# Patient Record
Sex: MALE | Race: WHITE | NOT HISPANIC OR LATINO | Employment: OTHER | URBAN - METROPOLITAN AREA
[De-identification: names, ages, dates, MRNs, and addresses within clinical notes are randomized per-mention and may not be internally consistent; named-entity substitution may affect disease eponyms.]

---

## 2018-06-19 ENCOUNTER — APPOINTMENT (EMERGENCY)
Dept: RADIOLOGY | Facility: HOSPITAL | Age: 53
End: 2018-06-19
Payer: COMMERCIAL

## 2018-06-19 ENCOUNTER — HOSPITAL ENCOUNTER (EMERGENCY)
Facility: HOSPITAL | Age: 53
Discharge: HOME | End: 2018-06-19
Attending: EMERGENCY MEDICINE
Payer: COMMERCIAL

## 2018-06-19 VITALS
SYSTOLIC BLOOD PRESSURE: 154 MMHG | RESPIRATION RATE: 18 BRPM | DIASTOLIC BLOOD PRESSURE: 76 MMHG | WEIGHT: 290 LBS | BODY MASS INDEX: 42.95 KG/M2 | OXYGEN SATURATION: 98 % | HEIGHT: 69 IN | HEART RATE: 110 BPM | TEMPERATURE: 97.6 F

## 2018-06-19 DIAGNOSIS — R46.89 ABNORMAL BEHAVIOR: Primary | ICD-10-CM

## 2018-06-19 LAB
ALBUMIN SERPL BCP-MCNC: 3.9 G/DL (ref 3.5–5)
ALP SERPL-CCNC: 63 U/L (ref 46–116)
ALT SERPL W P-5'-P-CCNC: 55 U/L (ref 12–78)
AMPHETAMINES SERPL QL SCN: NEGATIVE
ANION GAP SERPL CALCULATED.3IONS-SCNC: 12 MMOL/L (ref 4–13)
AST SERPL W P-5'-P-CCNC: 32 U/L (ref 5–45)
BACTERIA UR QL AUTO: ABNORMAL /HPF
BARBITURATES UR QL: NEGATIVE
BASOPHILS # BLD AUTO: 0.05 THOUSANDS/ΜL (ref 0–0.1)
BASOPHILS NFR BLD AUTO: 1 % (ref 0–1)
BENZODIAZ UR QL: NEGATIVE
BILIRUB SERPL-MCNC: 0.5 MG/DL (ref 0.2–1)
BILIRUB UR QL STRIP: NEGATIVE
BUN SERPL-MCNC: 16 MG/DL (ref 5–25)
CALCIUM SERPL-MCNC: 9.2 MG/DL (ref 8.3–10.1)
CAOX CRY URNS QL MICRO: ABNORMAL /HPF
CHLORIDE SERPL-SCNC: 101 MMOL/L (ref 100–108)
CLARITY UR: CLEAR
CO2 SERPL-SCNC: 24 MMOL/L (ref 21–32)
COCAINE UR QL: NEGATIVE
COLOR UR: ABNORMAL
CREAT SERPL-MCNC: 0.96 MG/DL (ref 0.6–1.3)
EOSINOPHIL # BLD AUTO: 0.12 THOUSAND/ΜL (ref 0–0.61)
EOSINOPHIL NFR BLD AUTO: 1 % (ref 0–6)
ERYTHROCYTE [DISTWIDTH] IN BLOOD BY AUTOMATED COUNT: 13.3 % (ref 11.6–15.1)
ETHANOL SERPL-MCNC: <3 MG/DL (ref 0–3)
GFR SERPL CREATININE-BSD FRML MDRD: 89 ML/MIN/1.73SQ M
GLUCOSE SERPL-MCNC: 197 MG/DL (ref 65–140)
GLUCOSE UR STRIP-MCNC: NEGATIVE MG/DL
HCT VFR BLD AUTO: 40.6 % (ref 36.5–49.3)
HGB BLD-MCNC: 13.2 G/DL (ref 12–17)
HGB UR QL STRIP.AUTO: NEGATIVE
HYALINE CASTS #/AREA URNS LPF: ABNORMAL /LPF
IMM GRANULOCYTES # BLD AUTO: 0.02 THOUSAND/UL (ref 0–0.2)
IMM GRANULOCYTES NFR BLD AUTO: 0 % (ref 0–2)
KETONES UR STRIP-MCNC: ABNORMAL MG/DL
LEUKOCYTE ESTERASE UR QL STRIP: NEGATIVE
LYMPHOCYTES # BLD AUTO: 2.24 THOUSANDS/ΜL (ref 0.6–4.47)
LYMPHOCYTES NFR BLD AUTO: 26 % (ref 14–44)
MCH RBC QN AUTO: 27.6 PG (ref 26.8–34.3)
MCHC RBC AUTO-ENTMCNC: 32.5 G/DL (ref 31.4–37.4)
MCV RBC AUTO: 85 FL (ref 82–98)
METHADONE UR QL: NEGATIVE
MONOCYTES # BLD AUTO: 0.73 THOUSAND/ΜL (ref 0.17–1.22)
MONOCYTES NFR BLD AUTO: 9 % (ref 4–12)
MUCOUS THREADS UR QL AUTO: ABNORMAL
NEUTROPHILS # BLD AUTO: 5.37 THOUSANDS/ΜL (ref 1.85–7.62)
NEUTS SEG NFR BLD AUTO: 63 % (ref 43–75)
NITRITE UR QL STRIP: NEGATIVE
NON-SQ EPI CELLS URNS QL MICRO: ABNORMAL /HPF
NRBC BLD AUTO-RTO: 0 /100 WBCS
OPIATES UR QL SCN: NEGATIVE
PCP UR QL: NEGATIVE
PH UR STRIP.AUTO: 5 [PH] (ref 5–9)
PLATELET # BLD AUTO: 224 THOUSANDS/UL (ref 149–390)
PMV BLD AUTO: 11.7 FL (ref 8.9–12.7)
POTASSIUM SERPL-SCNC: 3.5 MMOL/L (ref 3.5–5.3)
PROT SERPL-MCNC: 7.3 G/DL (ref 6.4–8.2)
PROT UR STRIP-MCNC: ABNORMAL MG/DL
RBC # BLD AUTO: 4.78 MILLION/UL (ref 3.88–5.62)
RBC #/AREA URNS AUTO: ABNORMAL /HPF
SODIUM SERPL-SCNC: 137 MMOL/L (ref 136–145)
SP GR UR STRIP.AUTO: >=1.03 (ref 1–1.03)
THC UR QL: POSITIVE
TROPONIN I SERPL-MCNC: <0.02 NG/ML
UROBILINOGEN UR QL STRIP.AUTO: 0.2 E.U./DL
WBC # BLD AUTO: 8.53 THOUSAND/UL (ref 4.31–10.16)
WBC #/AREA URNS AUTO: ABNORMAL /HPF

## 2018-06-19 PROCEDURE — 80053 COMPREHEN METABOLIC PANEL: CPT | Performed by: EMERGENCY MEDICINE

## 2018-06-19 PROCEDURE — 84484 ASSAY OF TROPONIN QUANT: CPT | Performed by: EMERGENCY MEDICINE

## 2018-06-19 PROCEDURE — 36415 COLL VENOUS BLD VENIPUNCTURE: CPT | Performed by: EMERGENCY MEDICINE

## 2018-06-19 PROCEDURE — 85025 COMPLETE CBC W/AUTO DIFF WBC: CPT | Performed by: EMERGENCY MEDICINE

## 2018-06-19 PROCEDURE — 80307 DRUG TEST PRSMV CHEM ANLYZR: CPT | Performed by: EMERGENCY MEDICINE

## 2018-06-19 PROCEDURE — 96372 THER/PROPH/DIAG INJ SC/IM: CPT

## 2018-06-19 PROCEDURE — 93005 ELECTROCARDIOGRAM TRACING: CPT

## 2018-06-19 PROCEDURE — 80320 DRUG SCREEN QUANTALCOHOLS: CPT | Performed by: EMERGENCY MEDICINE

## 2018-06-19 PROCEDURE — 81001 URINALYSIS AUTO W/SCOPE: CPT | Performed by: EMERGENCY MEDICINE

## 2018-06-19 PROCEDURE — G0480 DRUG TEST DEF 1-7 CLASSES: HCPCS | Performed by: EMERGENCY MEDICINE

## 2018-06-19 PROCEDURE — 71045 X-RAY EXAM CHEST 1 VIEW: CPT

## 2018-06-19 PROCEDURE — 99285 EMERGENCY DEPT VISIT HI MDM: CPT

## 2018-06-19 RX ORDER — LORAZEPAM 2 MG/ML
INJECTION INTRAMUSCULAR
Status: DISCONTINUED
Start: 2018-06-19 | End: 2018-06-19

## 2018-06-19 RX ORDER — LORAZEPAM 2 MG/ML
2 INJECTION INTRAMUSCULAR ONCE
Status: COMPLETED | OUTPATIENT
Start: 2018-06-19 | End: 2018-06-19

## 2018-06-19 RX ORDER — OLANZAPINE 10 MG/1
INJECTION, POWDER, LYOPHILIZED, FOR SOLUTION INTRAMUSCULAR
Status: COMPLETED
Start: 2018-06-19 | End: 2018-06-19

## 2018-06-19 RX ORDER — OLANZAPINE 10 MG/1
10 INJECTION, POWDER, LYOPHILIZED, FOR SOLUTION INTRAMUSCULAR ONCE
Status: COMPLETED | OUTPATIENT
Start: 2018-06-19 | End: 2018-06-19

## 2018-06-19 RX ADMIN — Medication 1.2 ML: at 06:29

## 2018-06-19 RX ADMIN — LORAZEPAM 2 MG: 2 INJECTION INTRAMUSCULAR at 06:29

## 2018-06-19 RX ADMIN — WATER 1.2 ML: 1 INJECTION INTRAMUSCULAR; INTRAVENOUS; SUBCUTANEOUS at 06:29

## 2018-06-19 RX ADMIN — LORAZEPAM 2 MG: 2 INJECTION INTRAMUSCULAR; INTRAVENOUS at 06:29

## 2018-06-19 RX ADMIN — OLANZAPINE 10 MG: 10 INJECTION, POWDER, FOR SOLUTION INTRAMUSCULAR at 06:29

## 2018-06-19 RX ADMIN — OLANZAPINE 10 MG: 10 INJECTION, POWDER, LYOPHILIZED, FOR SOLUTION INTRAMUSCULAR at 06:29

## 2018-06-19 NOTE — ED NOTES
Patient was woken up to have vital signs  Patient was calm and cooperative  Respirations easy and unlabored  Patient was tearful, expressing guilt for behaviors from the morning  Patient states that last night he was in a situation where the person that he and his fiance were visiting became angry  He states that when something like that happens it is "monkey see, monkey do" and that he gets angry and upset as well  Patient was reassured of safety and that as long as he continues to be cooperative he won't have to be medicated or restrained again  Constant visual observation remains       Gabriele Medina RN  06/19/18 5041

## 2018-06-19 NOTE — ED NOTES
Xiomara arranged for patient and fiance at the approval of the supervisor  Xiomara voucher provided  Patient aware of pending discharge  Agreeable       Kurt Davis RN  06/19/18 0280

## 2018-06-19 NOTE — ED PROVIDER NOTES
History  Chief Complaint   Patient presents with    Psychiatric Evaluation     pt brought in by squad with female friend he states is his fiance of 20 years  First states they were in a park, then states they were looking for apartments, states his car ran out of gas  States he is afraid of his brother, thinks his brother is trying to hurt him     38-year-old white male with mild MR reports leaving his brother's home because he felt there was unusual activity going on at the house  Patient reportedly left with his girlfriend and their car broke down en route to [place]  Patient and his girlfriend were walking in the park when they called 911 for assistance  Patient's story constantly changing, very poor historian, very paranoid  And agitated  Patient pacing around the room  Patient able to be redirected however clearly agitated  Patient denies alcohol use but states he did take 3 OxyContin for his chronic pain  Patient denies any complaints and states he does not want to be here  No suicidal ideation no homicidal ideation  Patient reports feeling uneasy        History provided by:  Patient, EMS personnel and medical records  History limited by: Patient with mild MR  Psychiatric Evaluation   Presenting symptoms: aggressive behavior, agitation, bizarre behavior and paranoid behavior    Presenting symptoms: no suicidal thoughts, no suicidal threats and no suicide attempt    Degree of incapacity (severity): Moderate  Onset quality:  Unable to specify  Timing:  Unable to specify  Progression:  Unable to specify  Context: noncompliance    Context: not alcohol use    Treatment compliance:  Some of the time  Relieved by:  None tried  Worsened by:  Lack of sleep  Ineffective treatments:  None tried  Associated symptoms: anxiety        None       History reviewed  No pertinent past medical history  Past Surgical History:   Procedure Laterality Date    CHOLECYSTECTOMY         History reviewed   No pertinent family history  I have reviewed and agree with the history as documented  Social History   Substance Use Topics    Smoking status: Former Smoker    Smokeless tobacco: Not on file    Alcohol use No        Review of Systems   Constitutional: Negative for chills and fever  HENT: Negative  Respiratory: Negative  Cardiovascular: Negative  Gastrointestinal: Negative  Genitourinary: Negative  Musculoskeletal: Negative  Skin: Negative  Neurological: Negative  Hematological: Negative  Psychiatric/Behavioral: Positive for agitation and paranoia  Negative for suicidal ideas  The patient is nervous/anxious  All other systems reviewed and are negative  Physical Exam  Physical Exam   Constitutional: He appears well-developed and well-nourished  HENT:   Head: Normocephalic and atraumatic  Right Ear: External ear normal    Left Ear: External ear normal    Nose: Nose normal    Mouth/Throat: Oropharynx is clear and moist    Eyes: Conjunctivae and EOM are normal    Neck: Normal range of motion  Neck supple  Cardiovascular: Regular rhythm, S1 normal, S2 normal, normal heart sounds, intact distal pulses and normal pulses  Tachycardia present  Pulmonary/Chest: Breath sounds normal  No respiratory distress  Abdominal: Soft  Normal appearance and bowel sounds are normal        Musculoskeletal:   PVD changes bilateral lower extremities   Neurological: He is alert  Skin: Skin is warm and dry  Capillary refill takes less than 2 seconds  Nursing note and vitals reviewed        Vital Signs  ED Triage Vitals   Temperature Pulse Respirations Blood Pressure SpO2   06/19/18 0447 06/19/18 0446 06/19/18 0446 06/19/18 0446 06/19/18 0446   97 8 °F (36 6 °C) (!) 112 20 (!) 175/89 99 %      Temp Source Heart Rate Source Patient Position - Orthostatic VS BP Location FiO2 (%)   06/19/18 0447 06/19/18 0446 06/19/18 0446 06/19/18 0446 --   Tympanic Monitor Lying Right arm       Pain Score 06/19/18 0446       No Pain           Vitals:    06/19/18 0446   BP: (!) 175/89   Pulse: (!) 112   Patient Position - Orthostatic VS: Lying       Visual Acuity      ED Medications  Medications   LORazepam (ATIVAN) 2 mg/mL injection 2 mg (2 mg Intramuscular Given 6/19/18 0629)   OLANZapine (ZyPREXA) IM injection 10 mg (10 mg Intramuscular Given 6/19/18 0629)   sterile water injection 10 mL (1 2 mL Injection Given 6/19/18 0629)       Diagnostic Studies  Results Reviewed     Procedure Component Value Units Date/Time    Troponin I [[id number]]     Lab Status:  No result Specimen:  Blood     Ethanol [[id number]]  (Normal) Collected:  06/19/18 0526    Lab Status:  Final result Specimen:  Blood from Arm, Left Updated:  06/19/18 0634     Ethanol Lvl <3 mg/dL     Comprehensive metabolic panel [[id number]]  (Abnormal) Collected:  06/19/18 0526    Lab Status:  Final result Specimen:  Blood from Arm, Left Updated:  06/19/18 6779     Sodium 137 mmol/L      Potassium 3 5 mmol/L      Chloride 101 mmol/L      CO2 24 mmol/L      Anion Gap 12 mmol/L      BUN 16 mg/dL      Creatinine 0 96 mg/dL      Glucose 197 (H) mg/dL      Calcium 9 2 mg/dL      AST 32 U/L      ALT 55 U/L      Alkaline Phosphatase 63 U/L      Total Protein 7 3 g/dL      Albumin 3 9 g/dL      Total Bilirubin 0 50 mg/dL      eGFR 89 ml/min/1 73sq m     Narrative:         National Kidney Disease Education Program recommendations are as follows:  GFR calculation is accurate only with a steady state creatinine  Chronic Kidney disease less than 60 ml/min/1 73 sq  meters  Kidney failure less than 15 ml/min/1 73 sq  meters      Urine Microscopic [[id number]]  (Abnormal) Collected:  06/19/18 0526    Lab Status:  Final result Specimen:  Urine from Urine, Clean Catch Updated:  06/19/18 0549     RBC, UA None Seen /hpf      WBC, UA 2-4 (A) /hpf      Epithelial Cells Innumerable (A) /hpf      Bacteria, UA Moderate (A) /hpf      Hyaline Casts, UA 0-1 (A) /lpf      Ca Oxalate Ellen, UA Occasional (A) /hpf      MUCOUS THREADS Moderate (A)    CBC and differential [[id number]] Collected:  06/19/18 0526    Lab Status:  Final result Specimen:  Blood from Arm, Left Updated:  06/19/18 0542     WBC 8 53 Thousand/uL      RBC 4 78 Million/uL      Hemoglobin 13 2 g/dL      Hematocrit 40 6 %      MCV 85 fL      MCH 27 6 pg      MCHC 32 5 g/dL      RDW 13 3 %      MPV 11 7 fL      Platelets 895 Thousands/uL      nRBC 0 /100 WBCs      Neutrophils Relative 63 %      Immat GRANS % 0 %      Lymphocytes Relative 26 %      Monocytes Relative 9 %      Eosinophils Relative 1 %      Basophils Relative 1 %      Neutrophils Absolute 5 37 Thousands/µL      Immature Grans Absolute 0 02 Thousand/uL      Lymphocytes Absolute 2 24 Thousands/µL      Monocytes Absolute 0 73 Thousand/µL      Eosinophils Absolute 0 12 Thousand/µL      Basophils Absolute 0 05 Thousands/µL     UA w Reflex to Microscopic w Reflex to Culture [[id number]]  (Abnormal) Collected:  06/19/18 0526    Lab Status:  Final result Specimen:  Urine from Urine, Clean Catch Updated:  06/19/18 0540     Color, UA Nina     Clarity, UA Clear     Specific Gravity, UA >=1 030     pH, UA 5 0     Leukocytes, UA Negative     Nitrite, UA Negative     Protein, UA Trace (A) mg/dl      Glucose, UA Negative mg/dl      Ketones, UA Trace (A) mg/dl      Urobilinogen, UA 0 2 E U /dl      Bilirubin, UA Negative     Blood, UA Negative    Rapid drug screen, urine [[id number]] Collected:  06/19/18 0526    Lab Status:   In process Specimen:  Urine from Urine, Catheter Updated:  06/19/18 0536                 XR chest 1 view portable    (Results Pending)              Procedures  ECG 12 Lead Documentation  Date/Time: 6/19/2018 6:57 AM  Performed by: Samina Cope  Authorized by: Samina Cope     Indications / Diagnosis:  AMS  ECG reviewed by me, the ED Provider: yes    Patient location:  ED  Previous ECG:     Comparison to cardiac monitor: Yes ()    Rate:     ECG rate:  129    ECG rate assessment: tachycardic    Rhythm:     Rhythm: sinus tachycardia    Ectopy:     Ectopy: none    QRS:     QRS axis:  Normal    QRS intervals:  Normal  Conduction:     Conduction: normal    ST segments:     ST segments:  Normal  T waves:     T waves: normal             Phone Contacts  ED Phone Contact    ED Course                               MDM  Number of Diagnoses or Management Options     Amount and/or Complexity of Data Reviewed  Clinical lab tests: ordered and reviewed  Tests in the radiology section of CPT®: ordered and reviewed  Tests in the medicine section of CPT®: ordered and reviewed  Decide to obtain previous medical records or to obtain history from someone other than the patient: yes  Independent visualization of images, tracings, or specimens: yes      CritCare Time    Disposition  Final diagnoses:   None     ED Disposition     None      Follow-up Information    None         Patient's Medications    No medications on file     No discharge procedures on file      ED Provider  Electronically Signed by           Rianna Faulkner MD  06/19/18 7198

## 2018-06-19 NOTE — ED NOTES
Pt states he has been to [facility] in the past   Faxed a signed release of records to [facility], [address] [place] [address]  06/19/18 1962

## 2018-06-19 NOTE — ED NOTES
Patient is awake and alert  Patient is oriented to person, place, time, and situation  Patient is pleasant and remorseful over behavior from the morning  Patient was thankful for the lunch tray and states "this was the best food I've had in awhile"  Will continue to monitor       Johnny Unger RN  06/19/18 9461

## 2018-06-19 NOTE — DISCHARGE INSTRUCTIONS
Please take a list of all of your medications and discharge paperwork with you to all of your follow-up medical visits  Please take all of your medications as directed  Please call your family doctor or return to the ER if you have increased shortness of breath, chest pain, fevers, chills, nausea, vomiting, diarrhea, or any other worsening symptoms

## 2018-06-19 NOTE — ED NOTES
Patient becoming increasingly agitated unable to verbally get him to comply  Patient trying to exit his room, security called and patient became increasingly more agitated attempted to elope  Patient became increasingly confrontational, attempted to enter another patient's room and had to be physically restrained and returned to his room  Police called for assistance, the patient become increasingly more agitated and combative  Patient poses huge safety risk to himself and others       Higinio Ventura MD  06/19/18 7556

## 2018-06-19 NOTE — ED NOTES
Lunch tray delivered to pt   Pt becomes tearful, stating "You people really are just trying to help me, i'm so sorry"     Carter Pace  06/19/18 3934

## 2018-06-19 NOTE — ED NOTES
Patient is sleeping with breakfast tray on chest  Respirations easy and unlabored  Responsive to verbal stimuli  Occasionally repositions self  One siderail remains up for safety  Continual observation remains       Rafael Perez RN  06/19/18 2177

## 2018-06-19 NOTE — ED NOTES
Patient sitting up eating lunch  Constant visual observation remains       Sherren Guadalajara, RN  06/19/18 6399

## 2018-06-19 NOTE — ED NOTES
Patient appears to be sleeping, laying on right side  Respirations easy and unlabored  Responsive to verbal stimuli and falls back asleep  Continual observation remains in place       Rafael Perez RN  06/19/18 6115

## 2018-06-19 NOTE — ED NOTES
Right arm released from restraint  Patient remains restless  States "I am trying to roll on my side"  Explained to patient that provided his behavior stays calm and cooperative, the rest of the restraints will be taken off shortly       Iris Addison RN  06/19/18

## 2018-06-19 NOTE — ED NOTES
Pt needs a lot of redirection  He is able to state where he is and the year but is unable to state the month  He needs continual reminding to stay in bed    When he gets out of bed and takes the covers off, he states that he is cold and needs to be reminded to lay in bed and use the blankets       Alona Obrien RN  06/19/18 1931

## 2018-06-19 NOTE — ED NOTES
Warm blankets and ice water given to patient  Patient was polite and thankful       Kwasi Subramanian, RN  06/19/18 7547

## 2018-06-19 NOTE — ED NOTES
Discharge instructions reviewed with patient  Patient states understanding  Patient is tearful on discharge, thankful for care  Discharged to home via taxi       Raisa Yip RN  06/19/18 5466

## 2018-06-19 NOTE — PROGRESS NOTES
6/19/18 @ 1000:  PES attempted to meet with patient, but he was sleeping soundly, therefore, PES will try again later  [place], [place]  1500: After PES met with patient, it was determined that this was a social issue, and not psychiatric  PES has been assisting patient's fiance (who is also a patient) to get a ride back home to [place], [place]  Patient said his engine "blew up "  PES has been working with patient's fiance to secure a ride, including contacting [place] Medicaid, but was unsuccessful  PES consulted with ED charge RN, who discussed with RN supervisor, and it was determined that hospital will pay for a taxi ride home  PES talked to Lisa Chester RN casemanager, who also felt that a taxi voucher was the least expensive solution  Charge RN is waiting for voucher, then TERRIE silvestre will be contacted  [address], [address]: Charge RN presented voucher to [facility], who called TERRIE silvestre;  @ [phone]    [address], [place]

## 2018-06-19 NOTE — ED CARE HANDOFF
Emergency Department Sign Out Note        Sign out and transfer of care from previous provider  See Separate Emergency Department note  The patient, Carlotta Sauer, was evaluated by the previous provider for behavioral disturbance  Workup Completed:  Medical clearance workup    ED Course / Workup Pending (followup):                               Procedures  MDM  Number of Diagnoses or Management Options  Abnormal behavior: new and requires workup     Amount and/or Complexity of Data Reviewed  Clinical lab tests: reviewed  Tests in the radiology section of CPT®: reviewed    Risk of Complications, Morbidity, and/or Mortality  General comments: Patient was medically cleared and evaluated by our crisis worker  At this time patient appears to be more lucid and very cooperative with staff  Patient denies any suicidal or homicidal ideation  At this point patient is not at any danger of harming himself or others  Patient is from [place]  Case was discussed with care management to arrange for cab voucher to discharge patient back to his hometown where he can follow up with his family doctor  Close return instructions given to return to the ER for any worsening symptoms  Patient agrees with discharge plan  Patient well appearing at time of discharge  Patient Progress  Patient progress: improved    CritCare Time      Disposition  Final diagnoses:   Abnormal behavior     Time reflects when diagnosis was documented in both MDM as applicable and the Disposition within this note     Time User Action Codes Description Comment    6/19/2018  3:15 PM Lenka Hess Add [R49 [id number]] Abnormal behavior       ED Disposition     ED Disposition Condition Comment    Discharge  Carlotta Sauer discharge to home/self care      Condition at discharge: Good        Follow-up Information     Follow up With Specialties Details Why Contact Kiko Woodard MD  In 2 days  [address] [facility] [address]  [phone]          Patient's Medications    No medications on file     No discharge procedures on file         ED Provider  Electronically Signed by     Gonzalo Moseley DO  06/19/18 1282

## 2018-06-19 NOTE — ED NOTES
Patient remains calm and cooperative  Patient states understanding when reminded to remain calm and cooperative  Restraints removed, breakfast tray provided       Doc Gatica RN  06/19/18 2862

## 2018-06-19 NOTE — ED NOTES
Pt became extremely agitated and out of control  Physically and verbally aggressive with staff  Attempting to enter other patient's room  Security present  Police called         Ephraim Dinh RN  06/19/18 7763

## 2018-06-19 NOTE — RESTRAINT FACE TO FACE
Restraint Face to Face   Olimpia Rodriguez 52 y o  male MRN: [id number]  Unit/Bed#: ED 07 Encounter: [id number]      Physical Evaluation patient has no obvious acute medical or physical problems    Purpose for Restraints/ Seclusion High risk for harm to others and himself  Patient's reaction to the intervention agitated and combative  Patient's medical condition stable  Patient's Behavioral condition agitated and aggressive  Restraints to be Continued

## 2018-06-21 LAB
ATRIAL RATE: 129 BPM
P AXIS: 43 DEGREES
PR INTERVAL: 132 MS
QRS AXIS: 53 DEGREES
QRSD INTERVAL: 84 MS
QT INTERVAL: 318 MS
QTC INTERVAL: 465 MS
T WAVE AXIS: 31 DEGREES
VENTRICULAR RATE: 129 BPM

## 2018-06-21 PROCEDURE — 93010 ELECTROCARDIOGRAM REPORT: CPT | Performed by: INTERNAL MEDICINE

## 2018-11-14 ENCOUNTER — HOSPITAL ENCOUNTER (EMERGENCY)
Facility: HOSPITAL | Age: 53
Discharge: HOME | End: 2018-11-15
Attending: EMERGENCY MEDICINE | Admitting: EMERGENCY MEDICINE
Payer: COMMERCIAL

## 2018-11-14 DIAGNOSIS — M79.89 LEG SWELLING: Primary | ICD-10-CM

## 2018-11-14 DIAGNOSIS — Z59.00 HOMELESSNESS: ICD-10-CM

## 2018-11-14 PROCEDURE — 99284 EMERGENCY DEPT VISIT MOD MDM: CPT

## 2018-11-14 SDOH — ECONOMIC STABILITY - HOUSING INSECURITY: HOMELESSNESS UNSPECIFIED: Z59.00

## 2018-11-15 ENCOUNTER — APPOINTMENT (EMERGENCY)
Dept: RADIOLOGY | Facility: HOSPITAL | Age: 53
End: 2018-11-15
Payer: COMMERCIAL

## 2018-11-15 VITALS
SYSTOLIC BLOOD PRESSURE: 134 MMHG | OXYGEN SATURATION: 96 % | RESPIRATION RATE: 20 BRPM | HEART RATE: 98 BPM | DIASTOLIC BLOOD PRESSURE: 63 MMHG | TEMPERATURE: 98.1 F | WEIGHT: 283.51 LBS

## 2018-11-15 LAB
ALBUMIN SERPL BCP-MCNC: 3.7 G/DL (ref 3.5–5)
ALP SERPL-CCNC: 72 U/L (ref 46–116)
ALT SERPL W P-5'-P-CCNC: 48 U/L (ref 12–78)
AMPHETAMINES SERPL QL SCN: NEGATIVE
ANION GAP SERPL CALCULATED.3IONS-SCNC: 8 MMOL/L (ref 4–13)
AST SERPL W P-5'-P-CCNC: 19 U/L (ref 5–45)
BARBITURATES UR QL: NEGATIVE
BASOPHILS # BLD AUTO: 0.04 THOUSANDS/ΜL (ref 0–0.1)
BASOPHILS NFR BLD AUTO: 1 % (ref 0–1)
BENZODIAZ UR QL: NEGATIVE
BILIRUB SERPL-MCNC: 0.8 MG/DL (ref 0.2–1)
BILIRUB UR QL STRIP: NEGATIVE
BUN SERPL-MCNC: 9 MG/DL (ref 5–25)
CALCIUM SERPL-MCNC: 8.9 MG/DL (ref 8.3–10.1)
CHLORIDE SERPL-SCNC: 101 MMOL/L (ref 100–108)
CLARITY UR: CLEAR
CO2 SERPL-SCNC: 28 MMOL/L (ref 21–32)
COCAINE UR QL: NEGATIVE
COLOR UR: NORMAL
CREAT SERPL-MCNC: 0.78 MG/DL (ref 0.6–1.3)
EOSINOPHIL # BLD AUTO: 0.16 THOUSAND/ΜL (ref 0–0.61)
EOSINOPHIL NFR BLD AUTO: 2 % (ref 0–6)
ERYTHROCYTE [DISTWIDTH] IN BLOOD BY AUTOMATED COUNT: 14.1 % (ref 11.6–15.1)
ETHANOL SERPL-MCNC: <3 MG/DL (ref 0–3)
GFR SERPL CREATININE-BSD FRML MDRD: 102 ML/MIN/1.73SQ M
GLUCOSE SERPL-MCNC: 145 MG/DL (ref 65–140)
GLUCOSE UR STRIP-MCNC: NEGATIVE MG/DL
HCT VFR BLD AUTO: 41.3 % (ref 36.5–49.3)
HGB BLD-MCNC: 13.1 G/DL (ref 12–17)
HGB UR QL STRIP.AUTO: NEGATIVE
IMM GRANULOCYTES # BLD AUTO: 0.01 THOUSAND/UL (ref 0–0.2)
IMM GRANULOCYTES NFR BLD AUTO: 0 % (ref 0–2)
KETONES UR STRIP-MCNC: NEGATIVE MG/DL
LEUKOCYTE ESTERASE UR QL STRIP: NEGATIVE
LYMPHOCYTES # BLD AUTO: 2.27 THOUSANDS/ΜL (ref 0.6–4.47)
LYMPHOCYTES NFR BLD AUTO: 31 % (ref 14–44)
MCH RBC QN AUTO: 27.6 PG (ref 26.8–34.3)
MCHC RBC AUTO-ENTMCNC: 31.7 G/DL (ref 31.4–37.4)
MCV RBC AUTO: 87 FL (ref 82–98)
METHADONE UR QL: NEGATIVE
MONOCYTES # BLD AUTO: 0.76 THOUSAND/ΜL (ref 0.17–1.22)
MONOCYTES NFR BLD AUTO: 10 % (ref 4–12)
NEUTROPHILS # BLD AUTO: 4.12 THOUSANDS/ΜL (ref 1.85–7.62)
NEUTS SEG NFR BLD AUTO: 56 % (ref 43–75)
NITRITE UR QL STRIP: NEGATIVE
NRBC BLD AUTO-RTO: 0 /100 WBCS
NT-PROBNP SERPL-MCNC: 13 PG/ML
OPIATES UR QL SCN: NEGATIVE
PCP UR QL: NEGATIVE
PH UR STRIP.AUTO: 6 [PH] (ref 5–9)
PLATELET # BLD AUTO: 202 THOUSANDS/UL (ref 149–390)
PMV BLD AUTO: 11.6 FL (ref 8.9–12.7)
POTASSIUM SERPL-SCNC: 3.6 MMOL/L (ref 3.5–5.3)
PROT SERPL-MCNC: 7.2 G/DL (ref 6.4–8.2)
PROT UR STRIP-MCNC: NEGATIVE MG/DL
RBC # BLD AUTO: 4.75 MILLION/UL (ref 3.88–5.62)
SODIUM SERPL-SCNC: 137 MMOL/L (ref 136–145)
SP GR UR STRIP.AUTO: >=1.03 (ref 1–1.03)
THC UR QL: NEGATIVE
TROPONIN I SERPL-MCNC: <0.02 NG/ML
UROBILINOGEN UR QL STRIP.AUTO: 0.2 E.U./DL
WBC # BLD AUTO: 7.36 THOUSAND/UL (ref 4.31–10.16)

## 2018-11-15 PROCEDURE — 85025 COMPLETE CBC W/AUTO DIFF WBC: CPT | Performed by: EMERGENCY MEDICINE

## 2018-11-15 PROCEDURE — 84484 ASSAY OF TROPONIN QUANT: CPT | Performed by: EMERGENCY MEDICINE

## 2018-11-15 PROCEDURE — 36415 COLL VENOUS BLD VENIPUNCTURE: CPT | Performed by: EMERGENCY MEDICINE

## 2018-11-15 PROCEDURE — 80320 DRUG SCREEN QUANTALCOHOLS: CPT | Performed by: EMERGENCY MEDICINE

## 2018-11-15 PROCEDURE — 81003 URINALYSIS AUTO W/O SCOPE: CPT | Performed by: EMERGENCY MEDICINE

## 2018-11-15 PROCEDURE — 80053 COMPREHEN METABOLIC PANEL: CPT | Performed by: EMERGENCY MEDICINE

## 2018-11-15 PROCEDURE — 80307 DRUG TEST PRSMV CHEM ANLYZR: CPT | Performed by: EMERGENCY MEDICINE

## 2018-11-15 PROCEDURE — 71045 X-RAY EXAM CHEST 1 VIEW: CPT

## 2018-11-15 PROCEDURE — 93005 ELECTROCARDIOGRAM TRACING: CPT

## 2018-11-15 PROCEDURE — 83880 ASSAY OF NATRIURETIC PEPTIDE: CPT | Performed by: EMERGENCY MEDICINE

## 2018-11-15 NOTE — ED NOTES
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

## 2018-11-15 NOTE — ED PROVIDER NOTES
History  Chief Complaint   Patient presents with    Leg Swelling     ( walked from [place] to [facility], then to [facility] about a week ago  since then legs have been swollen)     52 yowm c/o bilateral leg swelling worsening over the last few days after walking from [facility] to [place]  Took him about 4 days to do it  Pt  Is homeless for the last 2 weeks  Was at shelter in [facility] but had to leave  Went to friend's house in [place] and has been there the last 3 days but friend said he had to leave  No leg pain  No chest pain or sob  No fever  No vomiting   + slight diarrhea but thinks it is because he has not been eating well  H/o type 2 DM but is not on any meds  Says he sees a PMD in [address] [place] [address] and was last there a few months ago  History provided by:  Patient   used: No        None       History reviewed  No pertinent past medical history  History reviewed  No pertinent surgical history  History reviewed  No pertinent family history  I have reviewed and agree with the history as documented  Social History   Substance Use Topics    Smoking status: Former Smoker    Smokeless tobacco: Never Used    Alcohol use No        Review of Systems   Constitutional: Negative  Negative for chills and fever  HENT: Negative  Negative for congestion and sore throat  Eyes: Negative  Respiratory: Negative  Negative for cough and shortness of breath  Cardiovascular: Positive for leg swelling  Negative for chest pain  Gastrointestinal: Negative  Negative for abdominal pain, diarrhea, nausea and vomiting  Genitourinary: Negative  Negative for dysuria, flank pain and hematuria  Musculoskeletal: Negative  Negative for back pain and myalgias  Skin: Negative  Negative for rash and wound  Neurological: Negative  Negative for dizziness and headaches  Psychiatric/Behavioral: Negative  Negative for confusion and hallucinations   The patient is not nervous/anxious  All other systems reviewed and are negative  Physical Exam  Physical Exam   Constitutional: He is oriented to person, place, and time  He appears well-developed and well-nourished  No distress  Clothes dirty and socks smell bad   HENT:   Head: Normocephalic and atraumatic  Eyes: Pupils are equal, round, and reactive to light  Conjunctivae and EOM are normal  No scleral icterus  Neck: Normal range of motion  Neck supple  Cardiovascular: Normal rate, regular rhythm and normal heart sounds  No murmur heard  Pulmonary/Chest: Effort normal and breath sounds normal  No respiratory distress  Abdominal: Soft  Bowel sounds are normal  He exhibits no distension  There is no tenderness  Musculoskeletal: Normal range of motion  He exhibits edema  He exhibits no tenderness or deformity  + chronic stasis changes lower ext, no pitting edema, DP palp  Not hot to touch, no cellulitis   Neurological: He is alert and oriented to person, place, and time  No cranial nerve deficit  He exhibits normal muscle tone  Skin: Skin is warm and dry  Capillary refill takes less than 2 seconds  No rash noted  He is not diaphoretic  No erythema  No pallor  Psychiatric: He has a normal mood and affect  His behavior is normal    Nursing note and vitals reviewed        Vital Signs  ED Triage Vitals   Temperature Pulse Respirations Blood Pressure SpO2   11/14/18 2342 11/14/18 2341 11/14/18 2341 11/14/18 2342 11/14/18 2341   98 1 °F (36 7 °C) 95 18 138/63 98 %      Temp Source Heart Rate Source Patient Position - Orthostatic VS BP Location FiO2 (%)   11/14/18 2342 11/14/18 2341 11/14/18 2341 11/14/18 2341 --   Oral Monitor Sitting Right arm       Pain Score       11/14/18 2341       3           Vitals:    11/14/18 2341 11/14/18 2342 11/15/18 0050 11/15/18 0818   BP:  138/63 135/76 134/63   Pulse: 95  88 98   Patient Position - Orthostatic VS: Sitting Sitting Sitting Sitting       Visual Acuity      ED Medications  Medications - No data to display    Diagnostic Studies  Results Reviewed     Procedure Component Value Units Date/Time    Rapid drug screen, urine [[id number]]  (Normal) Collected:  11/15/18 0509    Lab Status:  Final result Specimen:  Urine from Urine, Clean Catch Updated:  11/15/18 0533     Amph/Meth UR Negative     Barbiturate Ur Negative     Benzodiazepine Urine Negative     Cocaine Urine Negative     Methadone Urine Negative     Opiate Urine Negative     PCP Ur Negative     THC Urine Negative    Narrative:         FOR MEDICAL PURPOSES ONLY  IF CONFIRMATION NEEDED PLEASE CONTACT THE LAB WITHIN 5 DAYS      Drug Screen Cutoff Levels:  AMPHETAMINE/METHAMPHETAMINES  1000 ng/mL  BARBITURATES     200 ng/mL  BENZODIAZEPINES     200 ng/mL  COCAINE      300 ng/mL  METHADONE      300 ng/mL  OPIATES      300 ng/mL  PHENCYCLIDINE     25 ng/mL  THC       50 ng/mL    UA w Reflex to Microscopic [[id number]] Collected:  11/15/18 0509    Lab Status:  Final result Specimen:  Urine from Urine, Clean Catch Updated:  11/15/18 0516     Color, UA Nina     Clarity, UA Clear     Specific Gravity, UA >=1 030     pH, UA 6 0     Leukocytes, UA Negative     Nitrite, UA Negative     Protein, UA Negative mg/dl      Glucose, UA Negative mg/dl      Ketones, UA Negative mg/dl      Urobilinogen, UA 0 2 E U /dl      Bilirubin, UA Negative     Blood, UA Negative    Ethanol [[id number]]  (Normal) Collected:  11/15/18 0003    Lab Status:  Final result Specimen:  Blood from Arm, Left Updated:  11/15/18 0046     Ethanol Lvl <3 mg/dL     B-type natriuretic peptide [[id number]]  (Normal) Collected:  11/15/18 0003    Lab Status:  Final result Specimen:  Blood from Arm, Left Updated:  11/15/18 0033     NT-proBNP 13 pg/mL     Troponin I [[id number]]  (Normal) Collected:  11/15/18 0003    Lab Status:  Final result Specimen:  Blood from Arm, Left Updated:  11/15/18 0031     Troponin I <0 02 ng/mL     Comprehensive metabolic panel [[id number]] (Abnormal) Collected:  11/15/18 0003    Lab Status:  Final result Specimen:  Blood from Arm, Left Updated:  11/15/18 0026     Sodium 137 mmol/L      Potassium 3 6 mmol/L      Chloride 101 mmol/L      CO2 28 mmol/L      ANION GAP 8 mmol/L      BUN 9 mg/dL      Creatinine 0 78 mg/dL      Glucose 145 (H) mg/dL      Calcium 8 9 mg/dL      AST 19 U/L      ALT 48 U/L      Alkaline Phosphatase 72 U/L      Total Protein 7 2 g/dL      Albumin 3 7 g/dL      Total Bilirubin 0 80 mg/dL      eGFR 102 ml/min/1 73sq m     Narrative:         National Kidney Disease Education Program recommendations are as follows:  GFR calculation is accurate only with a steady state creatinine  Chronic Kidney disease less than 60 ml/min/1 73 sq  meters  Kidney failure less than 15 ml/min/1 73 sq  meters  CBC and differential [[id number]] Collected:  11/15/18 0003    Lab Status:  Final result Specimen:  Blood from Arm, Left Updated:  11/15/18 0008     WBC 7 36 Thousand/uL      RBC 4 75 Million/uL      Hemoglobin 13 1 g/dL      Hematocrit 41 3 %      MCV 87 fL      MCH 27 6 pg      MCHC 31 7 g/dL      RDW 14 1 %      MPV 11 6 fL      Platelets 996 Thousands/uL      nRBC 0 /100 WBCs      Neutrophils Relative 56 %      Immat GRANS % 0 %      Lymphocytes Relative 31 %      Monocytes Relative 10 %      Eosinophils Relative 2 %      Basophils Relative 1 %      Neutrophils Absolute 4 12 Thousands/µL      Immature Grans Absolute 0 01 Thousand/uL      Lymphocytes Absolute 2 27 Thousands/µL      Monocytes Absolute 0 76 Thousand/µL      Eosinophils Absolute 0 16 Thousand/µL      Basophils Absolute 0 04 Thousands/µL                  XR chest 1 view portable   Final Result by Romero Sands DO (11/15 0907)      No active pulmonary disease on examination which is somewhat limited secondary to low lung volumes              Workstation performed: [id number]                    Procedures  ECG 12 Lead Documentation  Date/Time: 11/15/2018 12:34 AM  Performed by: Vamshi Duty  Authorized by: Vamshi Pierce     Indications / Diagnosis:  Leg swelling, homeless  ECG reviewed by me, the ED Provider: yes    Patient location:  ED  Previous ECG:     Previous ECG:  Unavailable  Interpretation:     Interpretation: abnormal    Rate:     ECG rate:  87    ECG rate assessment: normal    Rhythm:     Rhythm: sinus rhythm    Ectopy:     Ectopy: PVCs      Ectopy comment:  PVC x one  QRS:     QRS axis:  Normal  Conduction:     Conduction: normal    ST segments:     ST segments:  Normal  T waves:     T waves: normal    Other findings:     Other findings: prolonged qTc interval             Phone Contacts  ED Phone Contact    ED Course                               MDM  Number of Diagnoses or Management Options  Homelessness:   Leg swelling:   Diagnosis management comments: Will do medical screening tonight and observe here pending crisis or  consult in am   Signed out to night Dr  Due to end of shift  Blood work pending  CritCare Time    Disposition  Final diagnoses:   Leg swelling   Homelessness     Time reflects when diagnosis was documented in both MDM as applicable and the Disposition within this note     Time User Action Codes Description Comment    81/99/3039 76:09 AM Kade GARY Add [Z97 18] Leg swelling     03/58/2375 45:86 AM Iwona Garibay Add [X93 9] Homelessness       ED Disposition     ED Disposition Condition Comment    Discharge  Demetri Patel discharge to home/self care  Condition at discharge: stable        Follow-up Information     Follow up With Specialties Details [facility] [address]  In 3 days  [phone]            There are no discharge medications for this patient  No discharge procedures on file      ED Provider  Electronically Signed by           Klever Dowling MD  33/52/47 1136

## 2018-11-15 NOTE — ED NOTES
Pt ambulatory to ED 7 changing into gown,   Pt brought to ed by friend who is now leaving, friend admits that she does not know pt "very well" but is concerned for him as "his girlfriend just broke up with him and he's homeless and his legs are swollen and I just couldn't leave him but he can't stay with me" this note was taken by Isabel Ron RN at 22:25 18  I had to copy this information on patients new charge due to wrong birthday       Saige Phelan RN  11/15/18 9568

## 2018-11-15 NOTE — ED NOTES
0415: PES met with patient, who is homeless and in need of assistance  PES provided patient with phone number for [address]  Patient called while present, but exited during conversation          Homelessness:  [id number]    Jean Pierre MS

## 2018-11-15 NOTE — ED NOTES
By Ebony Sadler RN ok to take patient of the monitor and removed IV, patient is discharged but will remain in the Emergency department for the night for case management in the morning     Timur Oconnell RN  11/15/18 1168

## 2018-11-15 NOTE — DISCHARGE INSTRUCTIONS
Edema   WHAT YOU NEED TO KNOW:   Edema is swelling throughout your body  Edema is usually a sign that you are retaining fluid  The swelling may be caused by heart failure or kidney, thyroid, or liver disease  It may also be caused by medicines such as antidepressants, blood pressure medicines, or hormones  Sudden swelling around the lips or face may be a sign of a severe allergic reaction  Swelling of an arm or leg may be caused by blockage of your veins  DISCHARGE INSTRUCTIONS:   Seek care immediately if:   · You have shortness of breath at rest, especially when you lie down  · You cough up pink, foamy sputum  · You have chest pain  · Your heartbeat is fast or uneven  Contact your healthcare provider if:   · The swollen area feels cold and is pale or blue in color  · The swollen area feels warm, painful, and is red in color  · You have increased swelling or swelling in other parts of your body  · You have questions or concerns about your condition or care  Medicines:   · Medicines  help to get rid of extra body fluid  · Take your medicine as directed  Contact your healthcare provider if you think your medicine is not helping or if you have side effects  Tell him or her if you are allergic to any medicine  Keep a list of the medicines, vitamins, and herbs you take  Include the amounts, and when and why you take them  Bring the list or the pill bottles to follow-up visits  Carry your medicine list with you in case of an emergency  Follow up with your healthcare provider as directed:  Write down your questions so you remember to ask them during your visits  Manage edema:   · Elevate  your arms or legs as directed  Raise them above the level of your heart as often as you can  This will help decrease swelling and pain  Prop them on pillows or blankets to keep them elevated comfortably  · Wear pressure stockings as directed  The stockings are tight and put pressure on your legs  This helps to keep fluid from collecting in your legs or ankles  · Limit your salt intake  Salt causes your body to hold water  Ask about any other changes to your diet  · Stay active  Do not stand or sit for long periods of time  Ask your healthcare provider about the best exercise plan for you  · Keep your skin moist  using lotion, cream, or ointment  Ask your healthcare provider what to use and how often to use it  © 2017 [facility] Information is for End User's use only and may not be sold, redistributed or otherwise used for commercial purposes  All illustrations and images included in CareNotes® are the copyrighted property of A D A M [employer] Inc  or [employer]  The above information is an  only  It is not intended as medical advice for individual conditions or treatments  Talk to your doctor, nurse or pharmacist before following any medical regimen to see if it is safe and effective for you

## 2018-11-16 LAB
ATRIAL RATE: 87 BPM
P AXIS: 37 DEGREES
PR INTERVAL: 154 MS
QRS AXIS: 48 DEGREES
QRSD INTERVAL: 96 MS
QT INTERVAL: 404 MS
QTC INTERVAL: 486 MS
T WAVE AXIS: 29 DEGREES
VENTRICULAR RATE: 87 BPM

## 2018-11-16 PROCEDURE — 93010 ELECTROCARDIOGRAM REPORT: CPT | Performed by: INTERNAL MEDICINE

## 2018-12-07 ENCOUNTER — HOSPITAL ENCOUNTER (EMERGENCY)
Facility: HOSPITAL | Age: 53
Discharge: HOME | End: 2018-12-08
Attending: EMERGENCY MEDICINE | Admitting: EMERGENCY MEDICINE
Payer: COMMERCIAL

## 2018-12-07 DIAGNOSIS — E11.9 DIABETES (HCC): Primary | ICD-10-CM

## 2018-12-07 LAB — GLUCOSE SERPL-MCNC: 116 MG/DL (ref 65–140)

## 2018-12-07 PROCEDURE — 82948 REAGENT STRIP/BLOOD GLUCOSE: CPT

## 2018-12-07 PROCEDURE — 99284 EMERGENCY DEPT VISIT MOD MDM: CPT

## 2018-12-08 VITALS
SYSTOLIC BLOOD PRESSURE: 143 MMHG | TEMPERATURE: 97.4 F | DIASTOLIC BLOOD PRESSURE: 80 MMHG | HEIGHT: 67 IN | RESPIRATION RATE: 20 BRPM | HEART RATE: 100 BPM | OXYGEN SATURATION: 95 % | BODY MASS INDEX: 45.52 KG/M2 | WEIGHT: 290 LBS

## 2018-12-08 NOTE — DISCHARGE INSTRUCTIONS
Diabetes in the Older Adult   WHAT YOU NEED TO KNOW:   Older adults with diabetes are at risk for heart disease, stroke, kidney disease, blindness, and nerve damage  You may also be at risk for any of the following:  · Poor nutrition or low blood sugar levels    · Confusion or problems with memory, attention, or learning new things    · Trouble controlling urination or frequent urinary tract infections    · Trouble with coordination or balance    · Falls and injuries    · Pain    · Depression    · Open sores on your legs or feet  DISCHARGE INSTRUCTIONS:   Call 911 for any of the following:   · You have any of the following signs of a stroke:      ¨ Numbness or drooping on one side of your face     ¨ Weakness in an arm or leg    ¨ Confusion or difficulty speaking    ¨ Dizziness, a severe headache, or vision loss    · You have any of the following signs of a heart attack:      ¨ Squeezing, pressure, or pain in your chest that lasts longer than 5 minutes or returns    ¨ Discomfort or pain in your back, neck, jaw, stomach, or arm     ¨ Trouble breathing    ¨ Nausea or vomiting    ¨ Lightheadedness or a sudden cold sweat, especially with chest pain or trouble breathing  Return to the emergency department if:   · You have severe abdominal pain, or the pain spreads to your back  You may also be vomiting  · You have trouble staying awake or focusing  · You are shaking or sweating  · You have blurred or double vision  · Your breath has a fruity, sweet smell  · Your breathing is deep and labored, or rapid and shallow  · Your heartbeat is fast and weak  · You fall and get hurt  Contact your healthcare provider if:   · You are vomiting or have diarrhea  · You have an upset stomach and cannot eat the foods on your meal plan  · You feel weak or more tired than usual      · You feel dizzy, have headaches, or are easily irritated  · Your skin is red, warm, dry, or swollen       · You have a wound that does not heal      · You have numbness in your arms or legs  · You have trouble coping with your illness, or you feel anxious or depressed  · You have problems with your memory  · You have changes in your vision  · You have questions or concerns about your condition or care  Medicines  may be given to decrease the amount of sugar in your blood  You may also need medicine to lower your blood pressure or cholesterol, or medicine to prevent blood clots  Manage the ABCs and prevent problems caused by diabetes:   · Check your blood sugar levels as directed  Your healthcare provider will tell you when and how often to check during the day  Your healthcare provider will also tell you what your blood sugar levels should be before and after a meal  You may need to check for ketones in your urine or blood if your level is higher than directed  Write down your results and show them to your healthcare provider  Your provider may use the results to make changes to your medicine, food, or exercise schedules  Ask your healthcare provider for more information about how to treat a high or low blood sugar level  · Follow your meal plan as directed  A dietitian will help you make a meal plan to keep your blood sugar level steady and make sure you get enough nutrition  Do not skip meals  Your blood sugar level may drop too low if you have taken diabetes medicine and do not eat  Ask your healthcare provider about programs in your community that can deliver the meals to your home  · Try to be active for 30 to 60 minutes most days of the week  Exercise can help keep your blood sugar level steady, decrease your risk of heart disease, and help you lose weight  It can also help improve your balance and decrease your risk for falls  Work with your healthcare provider to create an exercise plan  Ask a family member or friend to exercise with you   Start slow and exercise for 5 to 10 minutes at a time  Examples of activities include walking or swimming  Include muscle strengthening activities 2 to 3 days each week  Include balance training 2 to 3 times each week  Activities that help increase balance include yoga and melissa chi      · Maintain a healthy weight  Ask your healthcare provider how much you should weigh  A healthy weight can help you control your diabetes and prevent heart disease  Ask your provider to help you create a weight loss plan if you are overweight  Together you can set manageable weight loss goals  · Do not smoke  Ask your healthcare provider for information if you currently smoke and need help to quit  Do not use e-cigarettes or smokeless tobacco in place of cigarettes or to help you quit  They still contain nicotine  · Manage stress  Stress may increase your blood sugar level  Deep breathing, muscle relaxation, and music may help you relax  Ask your healthcare provider for more information about these practices  Other ways to manage your diabetes:   · Check your feet every day for sores  Look at your whole foot, including the bottom, and between and under your toes  Check for wounds, corns, and calluses  Use a mirror to see the bottom of your feet  The skin on your feet may be shiny, tight, dry, or darker than normal  Your feet may also be cold and pale  Feel your feet by running your hands along the tops, bottoms, sides, and between your toes  Redness, swelling, and warmth are signs of blood flow problems that can lead to a foot ulcer  Do not try to remove corns or calluses yourself  · Wear medical alert identification  Wear medical alert jewelry or carry a card that says you have diabetes  Ask your healthcare provider where to get these items  · Ask about vaccines  You have a higher risk for serious illness if you get the flu, pneumonia, or hepatitis   Ask your healthcare provider if you should get a flu, pneumonia, shingles, or hepatitis B vaccine, and when to get the vaccine  · Keep all appointments  You may need to return to have your A1c checked every 3 months  You will need to return at least once each year to have your feet checked  You will need an eye exam once a year to check for retinopathy  You will also need urine tests every year to check for kidney problems  You may need tests to monitor for heart disease  Write down your questions so you remember to ask them during your visits  · Get help from family and friends  You may need help checking your blood sugar level, giving insulin injections, or preparing your meals  Ask your family and friends to help you with these tasks  Talk to your healthcare provider if you do not have someone at home to help you  A healthcare provider can come to your home to help you with these tasks  Follow up with your healthcare provider as directed: You may need to return to have your A1c checked every 3 months  You will need to return at least once each year to have your feet checked  You will need an eye exam once a year to check for retinopathy  You will also need urine tests every year to check for kidney problems  You may need tests to monitor for heart disease  Write down your questions so you remember to ask them during your visits  © 2017 [address] [employer] Information is for End User's use only and may not be sold, redistributed or otherwise used for commercial purposes  All illustrations and images included in CareNotes® are the copyrighted property of [employer] M [employer]  or [employer]  The above information is an  only  It is not intended as medical advice for individual conditions or treatments  Talk to your doctor, nurse or pharmacist before following any medical regimen to see if it is safe and effective for you

## 2018-12-08 NOTE — ED PROVIDER NOTES
History  Chief Complaint   Patient presents with    Hypoglycemia - no symptoms     Patient reports his sugar is low because he could not afford to buy food and has not eaten today  EMS reports that the [facility] police were called to [facility] for patient being diruptive  Patient told the police he is diabetic and has not eaten, police called the squad and sent patient here  History provided by:  Patient   used: No    Hypoglycemia - no symptoms   Severity:  Mild  Onset quality:  Gradual  Duration:  1 hour  Timing:  Constant  Progression:  Unchanged  Chronicity:  New  Context: decreased oral intake    Relieved by:  Nothing  Ineffective treatments:  None tried  Associated symptoms: no anxiety and no dizziness        None       Past Medical History:   Diagnosis Date    Diabetes mellitus ([facility] [address] )        Past Surgical History:   Procedure Laterality Date    CHOLECYSTECTOMY         History reviewed  No pertinent family history  I have reviewed and agree with the history as documented  Social History   Substance Use Topics    Smoking status: Current Some Day Smoker     Types: Cigarettes    Smokeless tobacco: Never Used    Alcohol use No        Review of Systems   Constitutional: Negative for activity change, appetite change, chills, fatigue and fever  HENT: Negative for congestion, dental problem, facial swelling, sore throat, tinnitus and trouble swallowing  Eyes: Negative for pain, discharge and itching  Respiratory: Negative for apnea, chest tightness and wheezing  Cardiovascular: Negative for chest pain, palpitations and leg swelling  Gastrointestinal: Negative for abdominal pain and nausea  Genitourinary: Negative for difficulty urinating, dysuria and flank pain  Musculoskeletal: Negative for arthralgias, back pain, gait problem, joint swelling and neck pain  Skin: Negative for color change, rash and wound     Neurological: Negative for dizziness and facial asymmetry  Psychiatric/Behavioral: Negative for agitation and behavioral problems  The patient is not nervous/anxious  All other systems reviewed and are negative  Physical Exam  Physical Exam   Constitutional: He is oriented to person, place, and time  He appears well-developed and well-nourished  No distress  HENT:   Head: Normocephalic and atraumatic  Right Ear: External ear normal    Left Ear: External ear normal    Eyes: Pupils are equal, round, and reactive to light  EOM are normal  Right eye exhibits no discharge  Left eye exhibits no discharge  Neck: Normal range of motion  Neck supple  No tracheal deviation present  No thyromegaly present  Cardiovascular: Normal rate and regular rhythm  No murmur heard  Pulmonary/Chest: Effort normal and breath sounds normal    Abdominal: Soft  Bowel sounds are normal  He exhibits no distension  There is no tenderness  Musculoskeletal: Normal range of motion  He exhibits no edema or deformity  Neurological: He is alert and oriented to person, place, and time  No cranial nerve deficit  He exhibits normal muscle tone  Skin: Skin is warm  Capillary refill takes less than 2 seconds  He is not diaphoretic  Psychiatric: He has a normal mood and affect  His behavior is normal    Nursing note and vitals reviewed        Vital Signs  ED Triage Vitals [12/07/18 2229]   Temperature Pulse Respirations Blood Pressure SpO2   (!) 96 9 °F (36 1 °C) 104 18 155/82 96 %      Temp Source Heart Rate Source Patient Position - Orthostatic VS BP Location FiO2 (%)   Tympanic Monitor Lying Left arm --      Pain Score       No Pain           Vitals:    12/07/18 2229   BP: 155/82   Pulse: 104   Patient Position - Orthostatic VS: Lying       Visual Acuity      ED Medications  Medications - No data to display    Diagnostic Studies  Results Reviewed     Procedure Component Value Units Date/Time    Fingerstick Glucose (POCT) [[id number]]  (Normal) Collected:  12/07/18 8580 Lab Status:  Final result Updated:  12/07/18 2327     POC Glucose 116 mg/dl                  No orders to display              Procedures  Procedures       Phone Contacts  ED Phone Contact    ED Course                               MDM  Number of Diagnoses or Management Options  Diabetes [facility]): new and does not require workup  Diagnosis management comments: Patient is a 57-year-old man with history of type 2 diabetes presents via EMS for evaluation and treatment because he feels he may be hypoglycemic  Was reportedly at a [address] getting something to eat when he became disruptive  Police were called, patient was cited and brought to the emergency department because he has not eaten all day and has type 2 diabetes  Patient denies any physical complaints at this time  States he was at the [address] to buy food with his food stamps  Patient appears well  Heart rate 90s on monitor throughout my exam   No physical exam abnormalities  Will check blood sugar, 116  Patient tolerated oral intake  Mental status GCS 15   Stable for discharge home  Amount and/or Complexity of Data Reviewed  Clinical lab tests: ordered and reviewed    Risk of Complications, Morbidity, and/or Mortality  Presenting problems: low  Diagnostic procedures: low  Management options: low      CritCare Time    Disposition  Final diagnoses:   Diabetes ([address] )     Time reflects when diagnosis was documented in both MDM as applicable and the Disposition within this note     Time User Action Codes Description Comment    12/7/2018 11:08 PM Irlanda Brown Add [E11 9] Diabetes [facility])       ED Disposition     ED Disposition Condition Comment    Discharge  Gosia Balderas discharge to home/self care      Condition at discharge: Good        Follow-up Information     Follow up With Specialties Details Why Contact Info    John Lawrence MD    [address] [address]  [phone]            Patient's Medications No medications on file     No discharge procedures on file      ED Provider  Electronically Signed by           Eloisa Kehr, MD  12/07/18 9079

## 2018-12-08 NOTE — ED NOTES
Pt given boxed lunch, to be discharged but has no way home, will keep patient in ER until am when shuttle is running     Adalberto Reed, CORA  12/08/18 0143

## 2018-12-08 NOTE — ED NOTES
Patient given breakfast tray  Awake and alert  Offers no complaints  Very appreciative of care and food       Abe De Leon, RN  12/08/18 2579

## 2018-12-08 NOTE — ED NOTES
Pt out of bed, ambulated to bathroom with cane, gait steady  Suggested pt try to get more sleep, he's agreeable, shuttle doesn't start running until 9am  Given andrew chicas and water   Pt very appreciative      Neelam Beckwith RN  12/08/18 0593

## 2018-12-14 ENCOUNTER — HOSPITAL ENCOUNTER (EMERGENCY)
Facility: HOSPITAL | Age: 53
Discharge: HOME | End: 2018-12-15
Attending: EMERGENCY MEDICINE | Admitting: EMERGENCY MEDICINE
Payer: COMMERCIAL

## 2018-12-14 ENCOUNTER — APPOINTMENT (EMERGENCY)
Dept: RADIOLOGY | Facility: HOSPITAL | Age: 53
End: 2018-12-14
Payer: COMMERCIAL

## 2018-12-14 DIAGNOSIS — M25.569 KNEE PAIN: ICD-10-CM

## 2018-12-14 DIAGNOSIS — Z59.00 HOMELESSNESS: ICD-10-CM

## 2018-12-14 DIAGNOSIS — R60.0 LOWER EXTREMITY EDEMA: Primary | ICD-10-CM

## 2018-12-14 LAB
ALBUMIN SERPL BCP-MCNC: 3.5 G/DL (ref 3.5–5)
ALP SERPL-CCNC: 78 U/L (ref 46–116)
ALT SERPL W P-5'-P-CCNC: 37 U/L (ref 12–78)
AMORPH URATE CRY URNS QL MICRO: ABNORMAL /HPF
ANION GAP SERPL CALCULATED.3IONS-SCNC: 8 MMOL/L (ref 4–13)
APTT PPP: 30 SECONDS (ref 26–38)
AST SERPL W P-5'-P-CCNC: 17 U/L (ref 5–45)
BACTERIA UR QL AUTO: ABNORMAL /HPF
BASOPHILS # BLD AUTO: 0.05 THOUSANDS/ΜL (ref 0–0.1)
BASOPHILS NFR BLD AUTO: 1 % (ref 0–1)
BILIRUB SERPL-MCNC: 0.6 MG/DL (ref 0.2–1)
BILIRUB UR QL STRIP: NEGATIVE
BUN SERPL-MCNC: 12 MG/DL (ref 5–25)
CALCIUM SERPL-MCNC: 9 MG/DL (ref 8.3–10.1)
CHLORIDE SERPL-SCNC: 104 MMOL/L (ref 100–108)
CLARITY UR: ABNORMAL
CO2 SERPL-SCNC: 26 MMOL/L (ref 21–32)
COLOR UR: YELLOW
CREAT SERPL-MCNC: 0.78 MG/DL (ref 0.6–1.3)
DEPRECATED D DIMER PPP: 350 NG/ML (FEU) (ref 190–520)
EOSINOPHIL # BLD AUTO: 0.1 THOUSAND/ΜL (ref 0–0.61)
EOSINOPHIL NFR BLD AUTO: 1 % (ref 0–6)
ERYTHROCYTE [DISTWIDTH] IN BLOOD BY AUTOMATED COUNT: 14.4 % (ref 11.6–15.1)
GFR SERPL CREATININE-BSD FRML MDRD: 102 ML/MIN/1.73SQ M
GLUCOSE SERPL-MCNC: 147 MG/DL (ref 65–140)
GLUCOSE UR STRIP-MCNC: NEGATIVE MG/DL
HCT VFR BLD AUTO: 37.6 % (ref 36.5–49.3)
HGB BLD-MCNC: 12 G/DL (ref 12–17)
HGB UR QL STRIP.AUTO: ABNORMAL
IMM GRANULOCYTES # BLD AUTO: 0.02 THOUSAND/UL (ref 0–0.2)
IMM GRANULOCYTES NFR BLD AUTO: 0 % (ref 0–2)
INR PPP: 1.14 (ref 0.86–1.16)
KETONES UR STRIP-MCNC: NEGATIVE MG/DL
LEUKOCYTE ESTERASE UR QL STRIP: ABNORMAL
LYMPHOCYTES # BLD AUTO: 2.73 THOUSANDS/ΜL (ref 0.6–4.47)
LYMPHOCYTES NFR BLD AUTO: 30 % (ref 14–44)
MCH RBC QN AUTO: 27.9 PG (ref 26.8–34.3)
MCHC RBC AUTO-ENTMCNC: 31.9 G/DL (ref 31.4–37.4)
MCV RBC AUTO: 87 FL (ref 82–98)
MONOCYTES # BLD AUTO: 0.69 THOUSAND/ΜL (ref 0.17–1.22)
MONOCYTES NFR BLD AUTO: 8 % (ref 4–12)
NEUTROPHILS # BLD AUTO: 5.39 THOUSANDS/ΜL (ref 1.85–7.62)
NEUTS SEG NFR BLD AUTO: 60 % (ref 43–75)
NITRITE UR QL STRIP: NEGATIVE
NON-SQ EPI CELLS URNS QL MICRO: ABNORMAL /HPF
NRBC BLD AUTO-RTO: 0 /100 WBCS
PH UR STRIP.AUTO: 6.5 [PH] (ref 5–9)
PLATELET # BLD AUTO: 206 THOUSANDS/UL (ref 149–390)
PMV BLD AUTO: 11.2 FL (ref 8.9–12.7)
POTASSIUM SERPL-SCNC: 3.7 MMOL/L (ref 3.5–5.3)
PROT SERPL-MCNC: 6.6 G/DL (ref 6.4–8.2)
PROT UR STRIP-MCNC: NEGATIVE MG/DL
PROTHROMBIN TIME: 11.9 SECONDS (ref 9.4–11.7)
RBC # BLD AUTO: 4.3 MILLION/UL (ref 3.88–5.62)
RBC #/AREA URNS AUTO: ABNORMAL /HPF
SODIUM SERPL-SCNC: 138 MMOL/L (ref 136–145)
SP GR UR STRIP.AUTO: 1.01 (ref 1–1.03)
UROBILINOGEN UR QL STRIP.AUTO: 0.2 E.U./DL
WBC # BLD AUTO: 8.98 THOUSAND/UL (ref 4.31–10.16)
WBC #/AREA URNS AUTO: ABNORMAL /HPF

## 2018-12-14 PROCEDURE — 90471 IMMUNIZATION ADMIN: CPT

## 2018-12-14 PROCEDURE — 85025 COMPLETE CBC W/AUTO DIFF WBC: CPT | Performed by: EMERGENCY MEDICINE

## 2018-12-14 PROCEDURE — 85610 PROTHROMBIN TIME: CPT | Performed by: EMERGENCY MEDICINE

## 2018-12-14 PROCEDURE — 85730 THROMBOPLASTIN TIME PARTIAL: CPT | Performed by: EMERGENCY MEDICINE

## 2018-12-14 PROCEDURE — 36415 COLL VENOUS BLD VENIPUNCTURE: CPT | Performed by: EMERGENCY MEDICINE

## 2018-12-14 PROCEDURE — 73564 X-RAY EXAM KNEE 4 OR MORE: CPT

## 2018-12-14 PROCEDURE — 90715 TDAP VACCINE 7 YRS/> IM: CPT | Performed by: EMERGENCY MEDICINE

## 2018-12-14 PROCEDURE — 72100 X-RAY EXAM L-S SPINE 2/3 VWS: CPT

## 2018-12-14 PROCEDURE — 80053 COMPREHEN METABOLIC PANEL: CPT | Performed by: EMERGENCY MEDICINE

## 2018-12-14 PROCEDURE — 99284 EMERGENCY DEPT VISIT MOD MDM: CPT

## 2018-12-14 PROCEDURE — 85379 FIBRIN DEGRADATION QUANT: CPT | Performed by: EMERGENCY MEDICINE

## 2018-12-14 PROCEDURE — 81001 URINALYSIS AUTO W/SCOPE: CPT | Performed by: EMERGENCY MEDICINE

## 2018-12-14 RX ORDER — ACETAMINOPHEN 325 MG/1
650 TABLET ORAL ONCE
Status: COMPLETED | OUTPATIENT
Start: 2018-12-14 | End: 2018-12-14

## 2018-12-14 RX ADMIN — ACETAMINOPHEN 650 MG: 325 TABLET, FILM COATED ORAL at 23:21

## 2018-12-14 RX ADMIN — TETANUS TOXOID, REDUCED DIPHTHERIA TOXOID AND ACELLULAR PERTUSSIS VACCINE, ADSORBED 0.5 ML: 5; 2.5; 8; 8; 2.5 SUSPENSION INTRAMUSCULAR at 23:21

## 2018-12-14 SDOH — ECONOMIC STABILITY - HOUSING INSECURITY: HOMELESSNESS UNSPECIFIED: Z59.00

## 2018-12-15 VITALS
BODY MASS INDEX: 43.85 KG/M2 | SYSTOLIC BLOOD PRESSURE: 127 MMHG | RESPIRATION RATE: 15 BRPM | DIASTOLIC BLOOD PRESSURE: 67 MMHG | HEART RATE: 87 BPM | TEMPERATURE: 98 F | WEIGHT: 280 LBS | OXYGEN SATURATION: 96 %

## 2018-12-15 NOTE — ED PROVIDER NOTES
History  Chief Complaint   Patient presents with    Leg Swelling     Patient arrives in [facility], under arrest with [facility]  Per BLS patient is here for increased leg swelling and for cut on right pinky  Patient was seen here on 12/7 and is homeless  Patient is a 49-year-old male that was brought in by police reportedly he is homeless and was detained  Patient reportedly stated had medical issues and wanted to come to the emergency room  Patient states his legs are swollen, is complaining of getting hit in falling down 2 days ago his left knee and left side of his back have pain  Patient denies any head injury, he has no neck pain  Patient denies any chest pain, no shortness of breath, no abdominal pain, no nausea vomiting or diarrhea  Patient has legs of always been edematous he has obvious venous stasis discoloration  Patient the states that he has homeless and has nowhere to go patient is also complaining about a the cut on his right 5th finger that is 3day-old  None       Past Medical History:   Diagnosis Date    Diabetes mellitus ([facility] [address] )        Past Surgical History:   Procedure Laterality Date    CHOLECYSTECTOMY         History reviewed  No pertinent family history  I have reviewed and agree with the history as documented  Social History   Substance Use Topics    Smoking status: Current Some Day Smoker     Types: Cigarettes    Smokeless tobacco: Never Used    Alcohol use No        Review of Systems   Constitutional: Negative for chills and fever  HENT: Negative for facial swelling and trouble swallowing  Eyes: Negative for photophobia and visual disturbance  Respiratory: Negative for chest tightness and shortness of breath  Cardiovascular: Positive for leg swelling  Negative for chest pain  Gastrointestinal: Negative for abdominal pain, nausea and vomiting  Genitourinary: Negative for dysuria and flank pain     Musculoskeletal: Positive for back pain  Negative for neck pain  Skin: Positive for wound  Neurological: Negative for weakness and numbness  Hematological: Negative  Psychiatric/Behavioral: Negative  Physical Exam  Physical Exam   Constitutional: He is oriented to person, place, and time  He appears well-developed and well-nourished  No distress  HENT:   Head: Normocephalic and atraumatic  Eyes: Pupils are equal, round, and reactive to light  EOM are normal    Neck: Normal range of motion  Cardiovascular: Normal rate and regular rhythm  Pulmonary/Chest: Effort normal and breath sounds normal  No respiratory distress  He has no wheezes  Abdominal: Soft  He exhibits no distension  There is no tenderness  Musculoskeletal: He exhibits edema  Left knee: He exhibits effusion  He exhibits normal range of motion, no swelling, no erythema, no bony tenderness and normal meniscus  Tenderness found  Medial joint line and lateral joint line tenderness noted  No patellar tendon tenderness noted  Lumbar back: He exhibits tenderness and pain  He exhibits normal range of motion, no bony tenderness, no swelling, no edema, no deformity and no laceration  Neurological: He is alert and oriented to person, place, and time  No cranial nerve deficit  Skin: Skin is warm  No erythema  Psychiatric: He has a normal mood and affect  Nursing note and vitals reviewed        Vital Signs  ED Triage Vitals [12/14/18 2124]   Temperature Pulse Respirations Blood Pressure SpO2   97 8 °F (36 6 °C) 103 20 168/93 96 %      Temp Source Heart Rate Source Patient Position - Orthostatic VS BP Location FiO2 (%)   Tympanic Monitor Lying Left arm --      Pain Score       6           Vitals:    12/14/18 2124 12/15/18 0519   BP: 168/93 127/67   Pulse: 103 87   Patient Position - Orthostatic VS: Lying Lying       Visual Acuity      ED Medications  Medications   acetaminophen (TYLENOL) tablet 650 mg (650 mg Oral Given 12/14/18 4071) tetanus-diphtheria-acellular pertussis (BOOSTRIX) IM injection 0 5 mL (0 5 mL Intramuscular Given 12/14/18 2321)       Diagnostic Studies  Results Reviewed     Procedure Component Value Units Date/Time    Urine Microscopic [[id number]]  (Abnormal) Collected:  12/14/18 2325    Lab Status:  Final result Specimen:  Urine from Urine, Clean Catch Updated:  12/14/18 2340     RBC, UA 0-1 (A) /hpf      WBC, UA 4-10 (A) /hpf      Epithelial Cells Occasional /hpf      Bacteria, UA Moderate (A) /hpf      AMORPH URATES Occasional /hpf     Comprehensive metabolic panel [[id number]]  (Abnormal) Collected:  12/14/18 2317    Lab Status:  Final result Specimen:  Blood from Arm, Right Updated:  12/14/18 2340     Sodium 138 mmol/L      Potassium 3 7 mmol/L      Chloride 104 mmol/L      CO2 26 mmol/L      ANION GAP 8 mmol/L      BUN 12 mg/dL      Creatinine 0 78 mg/dL      Glucose 147 (H) mg/dL      Calcium 9 0 mg/dL      AST 17 U/L      ALT 37 U/L      Alkaline Phosphatase 78 U/L      Total Protein 6 6 g/dL      Albumin 3 5 g/dL      Total Bilirubin 0 60 mg/dL      eGFR 102 ml/min/1 73sq m     Narrative:         National Kidney Disease Education Program recommendations are as follows:  GFR calculation is accurate only with a steady state creatinine  Chronic Kidney disease less than 60 ml/min/1 73 sq  meters  Kidney failure less than 15 ml/min/1 73 sq  meters      Protime-INR [[id number]]  (Abnormal) Collected:  12/14/18 2317    Lab Status:  Final result Specimen:  Blood from Arm, Right Updated:  12/14/18 2339     Protime 11 9 (H) seconds      INR 1 14    APTT [[id number]]  (Normal) Collected:  12/14/18 2317    Lab Status:  Final result Specimen:  Blood from Arm, Right Updated:  12/14/18 2339     PTT 30 seconds     D-Dimer [[id number]]  (Normal) Collected:  12/14/18 2317    Lab Status:  Final result Specimen:  Blood from Arm, Right Updated:  12/14/18 2339     D-Dimer, Quant 350 ng/ml (FEU)     UA w Reflex to Microscopic [[id number]] (Abnormal) Collected:  12/14/18 2325    Lab Status:  Final result Specimen:  Urine from Urine, Clean Catch Updated:  12/14/18 2333     Color, UA Yellow     Clarity, UA Slightly Cloudy     Specific Gravity, UA 1 015     pH, UA 6 5     Leukocytes, UA Small (A)     Nitrite, UA Negative     Protein, UA Negative mg/dl      Glucose, UA Negative mg/dl      Ketones, UA Negative mg/dl      Urobilinogen, UA 0 2 E U /dl      Bilirubin, UA Negative     Blood, UA Trace-lysed (A)    CBC and differential [[id number]] Collected:  12/14/18 2317    Lab Status:  Final result Specimen:  Blood from Arm, Right Updated:  12/14/18 2321     WBC 8 98 Thousand/uL      RBC 4 30 Million/uL      Hemoglobin 12 0 g/dL      Hematocrit 37 6 %      MCV 87 fL      MCH 27 9 pg      MCHC 31 9 g/dL      RDW 14 4 %      MPV 11 2 fL      Platelets 210 Thousands/uL      nRBC 0 /100 WBCs      Neutrophils Relative 60 %      Immat GRANS % 0 %      Lymphocytes Relative 30 %      Monocytes Relative 8 %      Eosinophils Relative 1 %      Basophils Relative 1 %      Neutrophils Absolute 5 39 Thousands/µL      Immature Grans Absolute 0 02 Thousand/uL      Lymphocytes Absolute 2 73 Thousands/µL      Monocytes Absolute 0 69 Thousand/µL      Eosinophils Absolute 0 10 Thousand/µL      Basophils Absolute 0 05 Thousands/µL                  XR lumbar spine 2 or 3 views   Final Result by Eloy Hudson MD (12/15 1051)   Mild degenerative changes of lumbar spine  Workstation performed: [id number]         XR knee 4+ views left injury   Final Result by Eloy Hudson MD (12/15 6816)   No acute osseous abnormality        Workstation performed: [id number]                    Procedures  Procedures       Phone Contacts  ED Phone Contact    ED Course                               MDM  Number of Diagnoses or Management Options  Homelessness:   Knee pain:   Lower extremity edema:   Diagnosis management comments: Patient was medically cleared, he needs to sleep in the emergency room until  could try to help him find a place to live with a home was hot line  Patient is stable and has no findings of acute medical issues       Amount and/or Complexity of Data Reviewed  Clinical lab tests: ordered and reviewed  Tests in the radiology section of CPT®: ordered and reviewed      CritCare Time    Disposition  Final diagnoses:   Lower extremity edema   Knee pain   Homelessness     Time reflects when diagnosis was documented in both MDM as applicable and the Disposition within this note     Time User Action Codes Description Comment    12/15/2018  1:22 AM Suzzahoseaa Casino Add [R60 0] Lower extremity edema     12/15/2018  1:22 AM Suzzanna Casino Add [M25 569] Knee pain     12/15/2018  1:22 AM Suzzahoseaa Bienvenidoino Add [Z59 0] Homelessness       ED Disposition     ED Disposition Condition Comment    Discharge  Kate Pilling discharge to home/self care  Condition at discharge: Stable        Follow-up Information     Follow up With Specialties Details Why Contact Info    Indira Pandey MD  Schedule an appointment as soon as possible for a visit in 3 days  [address] [phone]            There are no discharge medications for this patient  No discharge procedures on file      ED Provider  Electronically Signed by           Michaelle Cano MD  12/15/18 8748

## 2018-12-15 NOTE — ED NOTES
Patient is resting comfortably  Awakes easily with verbal stimulation,pt alert,oriented x3       Nurys Tim RN  12/15/18 7945

## 2018-12-15 NOTE — ED NOTES
Pt reported lost cell phone  Called [place]  Awaiting return call  Pt given phone to begin calling for places to stay         Melvin Metcalf RN  12/15/18 8557

## 2018-12-15 NOTE — DISCHARGE INSTRUCTIONS
Knee Pain   WHAT YOU NEED TO KNOW:   Knee pain may start suddenly, or it may be a long-term problem  You may have pain on the side, front, or back of your knee  You may have knee stiffness and swelling  You may hear popping sounds or feel like your knee is giving way or locking up as you walk  You may feel pain when you sit, stand, walk, or climb up and down stairs  Knee pain can be caused by conditions such as obesity, inflammation, or strains or tears in ligaments or tendons  DISCHARGE INSTRUCTIONS:   Follow up with your healthcare provider within 24 hours or as directed: You may need follow-up treatments, such as steroid injections to decrease pain  Write down your questions so you remember to ask them during your visits  Self-care:   · Rest  your knee so it can heal  Limit activities that increase your pain  · Ice  can help reduce swelling  Wrap ice in a towel and put it on your knee for as long and as often as directed  · Compression  with a brace or bandage can help reduce swelling  Use a brace or bandage only as directed  · Elevation  helps decrease pain and swelling  Elevate your knee while you are sitting or lying down  Prop your leg on pillows to keep your knee above the level of your heart  Medicines:   · NSAIDs  help decrease swelling and pain or fever  This medicine is available with or without a doctor's order  NSAIDs can cause stomach bleeding or kidney problems in certain people  If you take blood thinner medicine, always ask your healthcare provider if NSAIDs are safe for you  Always read the medicine label and follow directions  · Acetaminophen  decreases pain and fever  It is available without a doctor's order  Ask how much to take and when to take it  Follow directions  Acetaminophen can cause liver damage if not taken correctly  · Take your medicine as directed  Contact your healthcare provider if you think your medicine is not helping or if you have side effects   Tell him or her if you are allergic to any medicine  Keep a list of the medicines, vitamins, and herbs you take  Include the amounts, and when and why you take them  Bring the list or the pill bottles to follow-up visits  Carry your medicine list with you in case of an emergency  Exercise as directed: You may need to see a physical therapist or do recommended exercises to improve movement and decrease your pain  You may be directed to walk, swim, or ride a bike  Follow your exercise plan exactly as directed to avoid further injury  Contact your healthcare provider if:   · You have questions or concerns about your condition or care  Return to the emergency department if:   · Your pain is worse, even after treatment  · You cannot bend or straighten your leg completely  · The swelling around your knee does not go down even with treatment  · Your knee is painful and hot to the touch  © 2017 [address] Information is for End User's use only and may not be sold, redistributed or otherwise used for commercial purposes  All illustrations and images included in CareNotes® are the copyrighted property of A D A M , Inc  or [employer]  The above information is an  only  It is not intended as medical advice for individual conditions or treatments  Talk to your doctor, nurse or pharmacist before following any medical regimen to see if it is safe and effective for you  Leg Edema   WHAT YOU NEED TO KNOW:   Leg edema is swelling caused by fluid buildup  Your legs may swell if you sit or stand for long periods of time, are pregnant, or are injured  Swelling may also occur if you have heart failure or circulation problems  This means that your heart does not pump blood through your body as it should  DISCHARGE INSTRUCTIONS:   Self-care:   · Elevate your legs:  Raise your legs above the level of your heart as often as you can  This will help decrease swelling and pain  Prop your legs on pillows or blankets to keep them elevated comfortably  · Wear pressure stockings: These tight stockings put pressure on your legs to promote blood flow and prevent blood clots  Wear the stockings during the day  Do not wear them while you sleep  · Apply heat:  Heat helps decrease pain and swelling  Apply heat on the area for 20 to 30 minutes every 2 hours for as many days as directed  · Stay active:  Do not stand or sit for long periods of time  Ask your healthcare provider about the best exercise plan for you  · Eat healthy foods:  Healthy foods include fruits, vegetables, whole-grain breads, low-fat dairy products, beans, lean meats, and fish  Ask if you need to be on a special diet  Limit salt  Salt will make your body hold even more fluid  Follow up with your healthcare provider as directed:  Write down your questions so you remember to ask them during your visits  Contact your healthcare provider if:   · You have a fever or feel more tired than usual     · The veins in your legs look larger than usual  They may look full or bulging  · Your legs itch or feel heavy  · You have red or white areas or sores on your legs  The skin may also appear dimpled or have indentations  · You are gaining weight  · You have trouble moving your ankles  · The swelling does not go away, or other parts of your body swell  · You have questions or concerns about your condition or care  Return to the emergency department if:   · You cannot walk  · You feel faint or confused  · Your skin turns blue or gray  · Your leg feels warm, tender, and painful  It may be swollen and red  · You have chest pain or trouble breathing that is worse when you lie down  · You suddenly feel lightheaded and have trouble breathing  · You have new and sudden chest pain  You may have more pain when you take deep breaths or cough  You may also cough up blood    © 2017 [employer] [facility] is for End User's use only and may not be sold, redistributed or otherwise used for commercial purposes  All illustrations and images included in CareNotes® are the copyrighted property of A D A M , Inc  or [employer]  The above information is an  only  It is not intended as medical advice for individual conditions or treatments  Talk to your doctor, nurse or pharmacist before following any medical regimen to see if it is safe and effective for you

## 2018-12-15 NOTE — ED NOTES
Patient is resting comfortably  regular,unlabored respirations noted       Becca Turner RN  12/15/18 2203

## 2018-12-15 NOTE — ED NOTES
Patient is resting comfortably  Regular,unlabored respirations noted       Nataliya Troy RN  12/15/18 5038

## 2018-12-18 ENCOUNTER — APPOINTMENT (EMERGENCY)
Dept: RADIOLOGY | Facility: HOSPITAL | Age: 53
End: 2018-12-18
Payer: COMMERCIAL

## 2018-12-18 ENCOUNTER — HOSPITAL ENCOUNTER (EMERGENCY)
Facility: HOSPITAL | Age: 53
Discharge: HOME | End: 2018-12-18
Attending: EMERGENCY MEDICINE | Admitting: EMERGENCY MEDICINE
Payer: COMMERCIAL

## 2018-12-18 VITALS
SYSTOLIC BLOOD PRESSURE: 144 MMHG | BODY MASS INDEX: 43.85 KG/M2 | TEMPERATURE: 97.7 F | DIASTOLIC BLOOD PRESSURE: 87 MMHG | HEART RATE: 100 BPM | RESPIRATION RATE: 18 BRPM | WEIGHT: 280 LBS | OXYGEN SATURATION: 98 %

## 2018-12-18 DIAGNOSIS — Z59.00 HOMELESS: Primary | ICD-10-CM

## 2018-12-18 PROCEDURE — 72100 X-RAY EXAM L-S SPINE 2/3 VWS: CPT

## 2018-12-18 PROCEDURE — 99284 EMERGENCY DEPT VISIT MOD MDM: CPT

## 2018-12-18 SDOH — ECONOMIC STABILITY - HOUSING INSECURITY: HOMELESSNESS UNSPECIFIED: Z59.00

## 2018-12-18 NOTE — ED NOTES
Pt reports that he is homeless and hungry, hasn't eaten for two days  Pt denies suicidal or homicidal thoughts  Pt cooperative and appreciative, given food and drink after changing into hospital attire and given warm blankets       Robin Browning RN  12/18/18 8451

## 2018-12-18 NOTE — ED PROVIDER NOTES
History  Chief Complaint   Patient presents with    Psychiatric Evaluation     Pt sent in tonight by [facility] to have a psych eval because he his homeless  69-year-old homeless male reportedly sent in for evaluation  No SI, no HI, no complaints of pain, states he is hungry and feels a little cold  No fever, no nausea, no vomiting, no diarrhea, no cough, no shortness of breath, no chest pain, no headache  History provided by:  Patient and EMS personnel  Psychiatric Evaluation   Presenting symptoms: no aggressive behavior, no delusions, no depression, no disorganized speech, no disorganized thought process, no homicidal ideas, no suicidal thoughts, no suicidal threats and no suicide attempt    Degree of incapacity (severity): Unable to specify  Onset quality:  Unable to specify  Timing:  Unable to specify  Progression:  Unable to specify  Context: not alcohol use, not drug abuse and not recent medication change    Relieved by:  None tried  Worsened by:  Nothing  Ineffective treatments:  None tried  Associated symptoms: no abdominal pain, no anxiety, no decreased need for sleep, no feelings of worthlessness and no hyperventilation    Risk factors: no family violence and no hx of suicide attempts        None       Past Medical History:   Diagnosis Date    Diabetes mellitus ([facility] [address] )        Past Surgical History:   Procedure Laterality Date    CHOLECYSTECTOMY         History reviewed  No pertinent family history  I have reviewed and agree with the history as documented  Social History   Substance Use Topics    Smoking status: Current Some Day Smoker     Types: Cigarettes    Smokeless tobacco: Never Used    Alcohol use No        Review of Systems   Constitutional: Negative  HENT: Negative  Eyes: Negative  Respiratory: Negative  Cardiovascular: Positive for leg swelling  Gastrointestinal: Negative for abdominal pain  Genitourinary: Negative  Musculoskeletal: Negative  Skin: Negative  Neurological: Negative  Hematological: Negative  Psychiatric/Behavioral: Negative for homicidal ideas and suicidal ideas  The patient is not nervous/anxious  All other systems reviewed and are negative  Physical Exam  Physical Exam   Constitutional: He is oriented to person, place, and time  He appears well-developed and well-nourished  HENT:   Head: Normocephalic and atraumatic  Right Ear: External ear normal    Left Ear: External ear normal    Nose: Nose normal    Mouth/Throat: Oropharynx is clear and moist    Eyes: Pupils are equal, round, and reactive to light  Conjunctivae are normal    Neck: Normal range of motion  Neck supple  Cardiovascular: Normal rate, regular rhythm, normal heart sounds and intact distal pulses  Pulmonary/Chest: Effort normal and breath sounds normal    Abdominal: Soft  Bowel sounds are normal    Musculoskeletal: Normal range of motion  He exhibits edema  Neurological: He is alert and oriented to person, place, and time  GCS eye subscore is 4  GCS verbal subscore is 5  GCS motor subscore is 6  Mild MR   Skin: Skin is warm and dry  Capillary refill takes less than 2 seconds  Psychiatric: He has a normal mood and affect  His behavior is normal  Judgment and thought content normal    Nursing note and vitals reviewed        Vital Signs  ED Triage Vitals [12/18/18 0602]   Temperature Pulse Respirations Blood Pressure SpO2   97 7 °F (36 5 °C) 100 18 144/87 98 %      Temp Source Heart Rate Source Patient Position - Orthostatic VS BP Location FiO2 (%)   Tympanic Monitor Sitting Right arm --      Pain Score       No Pain           Vitals:    12/18/18 0602   BP: 144/87   Pulse: 100   Patient Position - Orthostatic VS: Sitting       Visual Acuity      ED Medications  Medications - No data to display    Diagnostic Studies  Results Reviewed     None                 No orders to display              Procedures  Procedures       Phone Contacts  ED Phone Contact    ED Course                               MDM  CritCare Time    Disposition  Final diagnoses:   Homeless     Time reflects when diagnosis was documented in both MDM as applicable and the Disposition within this note     Time User Action Codes Description Comment    12/18/2018  6:28 AM Eric Hoamaryse Add [Z59 0] Homeless       ED Disposition     ED Disposition Condition Comment    Discharge  Berl Pontiff discharge to home/self care  Condition at discharge: Stable        Follow-up Information     Follow up With Specialties Details Why Contact Info    Jermaine Garcia MD  Schedule an appointment as soon as possible for a visit As needed [address] [address] [phone]            Patient's Medications    No medications on file     No discharge procedures on file      ED Provider  Electronically Signed by           Mikala Fraire MD  12/18/18 8179

## 2018-12-18 NOTE — ED NOTES
12/18/18 @ 0815:  SAILAJA was asked to see patient, as he is homeless  Patient doesn't present with any mental health complaints, but does report back and knee pain  [facility] is unable to provide assistance with housing, but did inform patient about [facility]  SAILAJA asked ED MD to speak to patient about his pain complaints  Following that, patient can be discharged    MS Jean Pierre    Homelessness:  Z59 [id number]    MS Jean Pierre

## 2018-12-18 NOTE — DISCHARGE INSTRUCTIONS
Normal Exam   WHAT YOU NEED TO KNOW:   Your healthcare provider did not find a reason for your symptoms today  You may need to follow up with your healthcare provider or a specialist  He will work with you to try to find the cause of your symptoms  He may also run tests to find out more about your overall health  DISCHARGE INSTRUCTIONS:   Follow up with your healthcare provider or a specialist as directed:  Tell your healthcare provider about your symptoms  You may be given a complete physical exam and health checkup  Write down your questions so you remember to ask them during your visits  Maintain a healthy lifestyle:  Healthy foods and regular physical activity can improve your health  They also decrease your risk of heart disease, high blood pressure, and diabetes  · Get 30 minutes of activity every day  most days of the week  Ask your healthcare provider which activities are best for you  You can do 30 minutes at once or spread your activity throughout the day to get the recommended amount  · Eat a variety of healthy foods  Healthy foods include whole-grain breads, low-fat dairy products, beans, lean meats, and fish  Eat fruits and vegetables every day, especially those that are green, orange, and red  · Maintain a healthy weight  Ask your healthcare provider how much you should weigh  Ask him to help you create a weight loss plan if you are overweight  · Limit alcohol  Women should limit alcohol to 1 drink a day  Men should limit alcohol to 2 drinks a day  A drink of alcohol is 12 ounces of beer, 5 ounces of wine, or 1½ ounces of liquor  Do not smoke: If you smoke, it is never too late to quit  You lower your risk for many health problems if you quit  Ask your healthcare provider for information if you need help quitting  Contact your healthcare provider if:   · Your symptoms get worse, or you have new symptoms that bother you       · You have questions or concerns about your condition or care  · Your illness makes it difficult to follow a healthy diet  Return to the emergency department if:   · You have trouble breathing  · You have chest pain  · You feel lightheaded or faint  © 2017 [address] Information is for End User's use only and may not be sold, redistributed or otherwise used for commercial purposes  All illustrations and images included in CareNotes® are the copyrighted property of A [employer]  or [employer]  The above information is an  only  It is not intended as medical advice for individual conditions or treatments  Talk to your doctor, nurse or pharmacist before following any medical regimen to see if it is safe and effective for you

## 2018-12-18 NOTE — ED NOTES
Pt given a breakfast tray  Pt sts " thank you " pt ambulated to the bathroom to void       Onel Troy RN  12/18/18 7074

## 2018-12-19 ENCOUNTER — HOSPITAL ENCOUNTER (EMERGENCY)
Facility: HOSPITAL | Age: 53
Discharge: HOME | End: 2018-12-19
Attending: EMERGENCY MEDICINE | Admitting: EMERGENCY MEDICINE
Payer: COMMERCIAL

## 2018-12-19 VITALS
OXYGEN SATURATION: 97 % | HEART RATE: 90 BPM | SYSTOLIC BLOOD PRESSURE: 147 MMHG | TEMPERATURE: 97 F | RESPIRATION RATE: 16 BRPM | DIASTOLIC BLOOD PRESSURE: 90 MMHG

## 2018-12-19 DIAGNOSIS — Z59.00 HOMELESSNESS: Primary | ICD-10-CM

## 2018-12-19 PROCEDURE — 99283 EMERGENCY DEPT VISIT LOW MDM: CPT

## 2018-12-19 SDOH — ECONOMIC STABILITY - HOUSING INSECURITY: HOMELESSNESS UNSPECIFIED: Z59.00

## 2018-12-19 NOTE — DISCHARGE INSTRUCTIONS
Leg Pain   WHAT YOU NEED TO KNOW:   Leg pain may be caused by a variety of health conditions  Your tests did not show any broken bones or blood clots  DISCHARGE INSTRUCTIONS:   Return to the emergency department if:   · You have a fever  · Your leg starts to swell  · Your leg pain gets worse  · You have numbness or tingling in your leg or toes  · You cannot put any weight on or move your leg  Contact your healthcare provider if:   · Your pain does not decrease, even after treatment  · You have questions or concerns about your condition or care  Medicines:   · NSAIDs , such as ibuprofen, help decrease swelling, pain, and fever  This medicine is available with or without a doctor's order  NSAIDs can cause stomach bleeding or kidney problems in certain people  If you take blood thinner medicine, always ask your healthcare provider if NSAIDs are safe for you  Always read the medicine label and follow directions  · Take your medicine as directed  Contact your healthcare provider if you think your medicine is not helping or if you have side effects  Tell him of her if you are allergic to any medicine  Keep a list of the medicines, vitamins, and herbs you take  Include the amounts, and when and why you take them  Bring the list or the pill bottles to follow-up visits  Carry your medicine list with you in case of an emergency  Follow up with your healthcare provider as directed: You may need more tests to find the cause of your leg pain  You may need to see an orthopedic specialist or a physical therapist  Write down your questions so you remember to ask them during your visits  Manage your leg pain:   · Rest  your injured leg so that it can heal  You may need an immobilizer, brace, or splint to limit the movement of your leg  You may need to avoid putting any weight on your leg for at least 48 hours  Return to normal activities as directed      · Ice  the injury for 20 minutes every 4 hours for up to 24 hours, or as directed  Use an ice pack, or put crushed ice in a plastic bag  Cover it with a towel to protect your skin  Ice helps prevent tissue damage and decreases swelling and pain  · Elevate  your injured leg above the level of your heart as often as you can  This will help decrease swelling and pain  If possible, prop your leg on pillows or blankets to keep the area elevated comfortably  · Use assistive devices as directed  You may need to use a cane or crutches  Assistive devices help decrease pain and pressure on your leg when you walk  Ask your healthcare provider for more information about assistive devices and how to use them correctly  · Maintain a healthy weight  Extra body weight can cause pressure and pain in your hip, knee, and ankle joints  Ask your healthcare provider how much you should weigh  Ask him to help you create a weight loss plan if you are overweight  © 2017 [address] Information is for End User's use only and may not be sold, redistributed or otherwise used for commercial purposes  All illustrations and images included in CareNotes® are the copyrighted property of [employer]  or [employer]  The above information is an  only  It is not intended as medical advice for individual conditions or treatments  Talk to your doctor, nurse or pharmacist before following any medical regimen to see if it is safe and effective for you

## 2018-12-19 NOTE — ED NOTES
Awaiting case management  Patient asleep  Resps easy and unlabored       Joann Ocampo RN  12/19/18 8024

## 2018-12-22 ENCOUNTER — HOSPITAL ENCOUNTER (EMERGENCY)
Facility: HOSPITAL | Age: 53
Discharge: HOME | End: 2018-12-23
Attending: EMERGENCY MEDICINE | Admitting: EMERGENCY MEDICINE
Payer: COMMERCIAL

## 2018-12-22 VITALS
TEMPERATURE: 97.2 F | BODY MASS INDEX: 43.85 KG/M2 | OXYGEN SATURATION: 96 % | DIASTOLIC BLOOD PRESSURE: 101 MMHG | WEIGHT: 280 LBS | HEART RATE: 109 BPM | SYSTOLIC BLOOD PRESSURE: 184 MMHG | RESPIRATION RATE: 20 BRPM

## 2018-12-22 DIAGNOSIS — Z59.00 HOMELESSNESS: ICD-10-CM

## 2018-12-22 DIAGNOSIS — I73.9 PERIPHERAL VASCULAR DISEASE (HCC): ICD-10-CM

## 2018-12-22 DIAGNOSIS — R60.9 PERIPHERAL EDEMA: Primary | ICD-10-CM

## 2018-12-22 PROCEDURE — 99283 EMERGENCY DEPT VISIT LOW MDM: CPT

## 2018-12-22 SDOH — ECONOMIC STABILITY - HOUSING INSECURITY: HOMELESSNESS UNSPECIFIED: Z59.00

## 2018-12-23 VITALS
DIASTOLIC BLOOD PRESSURE: 80 MMHG | OXYGEN SATURATION: 98 % | SYSTOLIC BLOOD PRESSURE: 132 MMHG | WEIGHT: 280 LBS | BODY MASS INDEX: 43.85 KG/M2 | HEART RATE: 95 BPM | TEMPERATURE: 96.7 F | RESPIRATION RATE: 20 BRPM

## 2018-12-23 DIAGNOSIS — I87.2 CHRONIC VENOUS INSUFFICIENCY: ICD-10-CM

## 2018-12-23 DIAGNOSIS — M79.606 LEG PAIN: Primary | ICD-10-CM

## 2018-12-23 DIAGNOSIS — L03.90 CELLULITIS: ICD-10-CM

## 2018-12-23 PROCEDURE — 99282 EMERGENCY DEPT VISIT SF MDM: CPT

## 2018-12-23 RX ORDER — NAPROXEN 500 MG/1
500 TABLET ORAL 2 TIMES DAILY WITH MEALS
Qty: 10 TABLET | Refills: 0 | Status: SHIPPED | OUTPATIENT
Start: 2018-12-23 | End: 2019-01-04

## 2018-12-23 RX ORDER — CEPHALEXIN 500 MG/1
500 CAPSULE ORAL ONCE
Status: COMPLETED | OUTPATIENT
Start: 2018-12-23 | End: 2018-12-23

## 2018-12-23 RX ORDER — CEPHALEXIN 500 MG/1
500 CAPSULE ORAL EVERY 8 HOURS SCHEDULED
Qty: 21 CAPSULE | Refills: 0 | Status: SHIPPED | OUTPATIENT
Start: 2018-12-23 | End: 2018-12-30

## 2018-12-23 RX ADMIN — CEPHALEXIN 500 MG: 500 CAPSULE ORAL at 17:53

## 2018-12-23 NOTE — ED PROVIDER NOTES
History  Chief Complaint   Patient presents with    Leg Swelling     Pt brought in by EMS for eval of bilateral leg swelling  Pt has been here 4 previous times since 12/7 for the same thing  Pt admits to also being at [facility] for same  Pt is homeless at this time  69-year-old homeless gentleman well known to the ED seen by me for the last 3 visits here tonight with same complaints of leg swelling/pain, feeling hungry and being homeless  Patient has also been seen and [facility] for the same  Patient given assistance by Family guidance, case management and social work but continues to use 911 as a method to come to the ED  Patient is sleeping comfortably on a gurney when I walked in the room to interview him  No new complaints, no fever, no chest pain, no shortness of breath, no change in his lower extremity edema or peripheral vascular disease from prior visits        History provided by:  Patient      None       Past Medical History:   Diagnosis Date    Diabetes mellitus ([facility] [address] )     Homelessness        Past Surgical History:   Procedure Laterality Date    CHOLECYSTECTOMY         History reviewed  No pertinent family history  I have reviewed and agree with the history as documented  Social History   Substance Use Topics    Smoking status: Current Some Day Smoker     Packs/day: 1 00     Types: Cigarettes    Smokeless tobacco: Never Used    Alcohol use No        Review of Systems   Constitutional: Positive for fatigue  HENT: Negative  Respiratory: Negative for cough, chest tightness, shortness of breath, wheezing and stridor  Cardiovascular: Positive for leg swelling  Negative for chest pain and palpitations  Gastrointestinal: Negative  Genitourinary: Negative  Musculoskeletal: Negative for back pain  Skin: Negative  Neurological: Negative  Hematological: Negative  Psychiatric/Behavioral: Negative           Homeless   All other systems reviewed and are negative  Physical Exam  Physical Exam   Constitutional: He is oriented to person, place, and time  He appears well-developed and well-nourished  No distress  HENT:   Head: Normocephalic and atraumatic  Right Ear: External ear normal    Left Ear: External ear normal    Nose: Nose normal    Mouth/Throat: Oropharynx is clear and moist    Eyes: Conjunctivae and EOM are normal    Neck: Normal range of motion  Neck supple  Cardiovascular: Normal rate, regular rhythm, normal heart sounds and intact distal pulses  Pulmonary/Chest: Effort normal and breath sounds normal  He has no wheezes  He has no rales  Abdominal: Soft  Bowel sounds are normal    Musculoskeletal: Normal range of motion  He exhibits edema  Neurological: He is alert and oriented to person, place, and time  Skin: Skin is warm and dry  Capillary refill takes less than 2 seconds  Psychiatric: He has a normal mood and affect  His behavior is normal  Judgment and thought content normal    Nursing note and vitals reviewed        Vital Signs  ED Triage Vitals [12/22/18 2357]   Temperature Pulse Respirations Blood Pressure SpO2   (!) 97 2 °F (36 2 °C) (!) 109 20 (!) 184/101 96 %      Temp Source Heart Rate Source Patient Position - Orthostatic VS BP Location FiO2 (%)   Tympanic Monitor Sitting Left arm --      Pain Score       No Pain           Vitals:    12/22/18 2357   BP: (!) 184/101   Pulse: (!) 109   Patient Position - Orthostatic VS: Sitting       Visual Acuity      ED Medications  Medications - No data to display    Diagnostic Studies  Results Reviewed     None                 No orders to display              Procedures  Procedures       Phone Contacts  ED Phone Contact    ED Course                               [facility]  CritCare Time    Disposition  Final diagnoses:   Peripheral edema   Peripheral vascular disease ([facility] [address] )   Homelessness     Time reflects when diagnosis was documented in both MDM as applicable and the Disposition within this note     Time User Action Codes Description Comment    12/23/2018  1:04 AM Elisa Junaid Add [R60 9] Peripheral edema     12/23/2018  1:04 AM Elisa Junaid Add [I73 9] Peripheral vascular disease ([address] )     12/23/2018  1:04 AM Elisa Junaid Add [Z59 0] Homelessness       ED Disposition     ED Disposition Condition Comment    Discharge  Roxana Maninder discharge to home/self care  Condition at discharge: stable        Follow-up Information     Follow up With Specialties Details [address]  In 3 days  [phone]            There are no discharge medications for this patient  No discharge procedures on file      ED Provider  Electronically Signed by           Raheem Henriquez MD  01/05/19 3390

## 2018-12-23 NOTE — ED NOTES
Pt is given a warm blanket, pt awake and states that "I feel better" and went back to sleep        Nic Michel RN  12/23/18 1066

## 2018-12-23 NOTE — ED PROVIDER NOTES
History  Chief Complaint   Patient presents with    Leg Swelling     states hes been walking all day and his legs are swollen  states hes going downstairs after he leaves here to get cake and coffee     53 y/o male presents with bilateral leg swelling with left leg redness tracking up his thigh noted a couple of weeks ago getting worse  Also complaining of pain,denies fevers,chills, nausea,vomiting or other symptoms  multiple admits ot he ED in the past week for the same  Also homeless  Walking all day  denies dyspnea,chest pain or any other symptoms  History provided by:  Patient   used: No        None       Past Medical History:   Diagnosis Date    Diabetes mellitus ([facility] [address] )     Homelessness        Past Surgical History:   Procedure Laterality Date    CHOLECYSTECTOMY         History reviewed  No pertinent family history  I have reviewed and agree with the history as documented  Social History   Substance Use Topics    Smoking status: Current Some Day Smoker     Types: Cigarettes    Smokeless tobacco: Never Used    Alcohol use No        Review of Systems   All other systems reviewed and are negative  Physical Exam  Physical Exam   Constitutional: He is oriented to person, place, and time  He appears well-developed and well-nourished  HENT:   Head: Normocephalic and atraumatic  Eyes: Pupils are equal, round, and reactive to light  EOM are normal    Neck: Normal range of motion  Neck supple  Cardiovascular: Normal rate and regular rhythm  Pulmonary/Chest: Effort normal and breath sounds normal    Abdominal: Soft  Bowel sounds are normal    Musculoskeletal: Normal range of motion  He exhibits edema and tenderness  Leg erythema and rash noted inner thigh, left greater than right, lower legs edema noted with signs of chronic venous insufficiency  Neurological: He is alert and oriented to person, place, and time  Skin: Skin is warm and dry     Psychiatric: He has a normal mood and affect  Nursing note and vitals reviewed  Vital Signs  ED Triage Vitals [12/23/18 1724]   Temperature Pulse Respirations Blood Pressure SpO2   (!) 96 7 °F (35 9 °C) 95 20 132/80 98 %      Temp src Heart Rate Source Patient Position - Orthostatic VS BP Location FiO2 (%)   -- -- -- -- --      Pain Score       --           Vitals:    12/23/18 1724   BP: 132/80   Pulse: 95       Visual Acuity      ED Medications  Medications   cephalexin (KEFLEX) capsule 500 mg (500 mg Oral Given 12/23/18 1753)       Diagnostic Studies  Results Reviewed     None                 No orders to display              Procedures  Procedures       Phone Contacts  ED Phone Contact    ED Course                               MDM  Number of Diagnoses or Management Options  Leg pain:   Diagnosis management comments:  Discharged with follow up, appropriate instructions and medications and patient verbalized understanding and had no further questions at the time of discharge  Patient well appearing and had stable vital signs at discharge  Patient Progress  Patient progress: stable    CritCare Time    Disposition  Final diagnoses:   Leg pain   Cellulitis   Chronic venous insufficiency     Time reflects when diagnosis was documented in both MDM as applicable and the Disposition within this note     Time User Action Codes Description Comment    12/23/2018  5:49 PM Radha Ortiz Add [M79 606] Leg pain     12/23/2018  6:02 PM Radha Ortiz Add [L03 90] Cellulitis     12/23/2018  6:02 PM Ranjit Ortiz Add [I87 2] Chronic venous insufficiency       ED Disposition     ED Disposition Condition Comment    Discharge  Jesse Padron discharge to home/self care      Condition at discharge: Stable        Follow-up Information     Follow up With Specialties Details Why Contact Info Additional Information    Juancarlos Comer MD  Schedule an appointment as soon as possible for a visit  [address] [facility] [address] [facility] Emergency Department Emergency Medicine  If symptoms worsen [address] [facility] [address]  [phone] [facility], [place], [place], [address]          Patient's Medications   Discharge Prescriptions    CEPHALEXIN (KEFLEX) 500 MG CAPSULE    Take 1 capsule (500 mg total) by mouth every 8 (eight) hours for 7 days       Start Date: 12/23/2018End Date: 12/30/2018       Order Dose: 500 mg       Quantity: 21 capsule    Refills: 0    NAPROXEN (NAPROSYN) 500 MG TABLET    Take 1 tablet (500 mg total) by mouth 2 (two) times a day with meals for 5 days       Start Date: 12/23/2018End Date: 12/28/2018       Order Dose: 500 mg       Quantity: 10 tablet    Refills: 0     No discharge procedures on file      ED Provider  Electronically Signed by           Tomie Ahumada,   12/23/18 1801       Radha Ortiz,   12/23/18 180

## 2018-12-23 NOTE — DISCHARGE INSTRUCTIONS
Leg Edema   WHAT YOU NEED TO KNOW:   Leg edema is swelling caused by fluid buildup  Your legs may swell if you sit or stand for long periods of time, are pregnant, or are injured  Swelling may also occur if you have heart failure or circulation problems  This means that your heart does not pump blood through your body as it should  DISCHARGE INSTRUCTIONS:   Self-care:   · Elevate your legs:  Raise your legs above the level of your heart as often as you can  This will help decrease swelling and pain  Prop your legs on pillows or blankets to keep them elevated comfortably  · Wear pressure stockings: These tight stockings put pressure on your legs to promote blood flow and prevent blood clots  Wear the stockings during the day  Do not wear them while you sleep  · Apply heat:  Heat helps decrease pain and swelling  Apply heat on the area for 20 to 30 minutes every 2 hours for as many days as directed  · Stay active:  Do not stand or sit for long periods of time  Ask your healthcare provider about the best exercise plan for you  · Eat healthy foods:  Healthy foods include fruits, vegetables, whole-grain breads, low-fat dairy products, beans, lean meats, and fish  Ask if you need to be on a special diet  Limit salt  Salt will make your body hold even more fluid  Follow up with your healthcare provider as directed:  Write down your questions so you remember to ask them during your visits  Contact your healthcare provider if:   · You have a fever or feel more tired than usual     · The veins in your legs look larger than usual  They may look full or bulging  · Your legs itch or feel heavy  · You have red or white areas or sores on your legs  The skin may also appear dimpled or have indentations  · You are gaining weight  · You have trouble moving your ankles  · The swelling does not go away, or other parts of your body swell      · You have questions or concerns about your condition or care   Return to the emergency department if:   · You cannot walk  · You feel faint or confused  · Your skin turns blue or gray  · Your leg feels warm, tender, and painful  It may be swollen and red  · You have chest pain or trouble breathing that is worse when you lie down  · You suddenly feel lightheaded and have trouble breathing  · You have new and sudden chest pain  You may have more pain when you take deep breaths or cough  You may also cough up blood  © 2017 [employer]  Information is for End User's use only and may not be sold, redistributed or otherwise used for commercial purposes  All illustrations and images included in CareNotes® are the copyrighted property of A D A M , Inc  or [employer]  The above information is an  only  It is not intended as medical advice for individual conditions or treatments  Talk to your doctor, nurse or pharmacist before following any medical regimen to see if it is safe and effective for you

## 2018-12-23 NOTE — ED NOTES
Pt said he walked all around [place] today and came here tonight because his legs were bothering him  Requested a turkey sandwich  Instructed to follow up at [facility] for a place to stay nelson Gaona RN  12/23/18 1107

## 2018-12-23 NOTE — DISCHARGE INSTRUCTIONS
Leg Pain   WHAT YOU NEED TO KNOW:   Leg pain may be caused by a variety of health conditions  Your tests did not show any broken bones or blood clots  DISCHARGE INSTRUCTIONS:   Return to the emergency department if:   · You have a fever  · Your leg starts to swell  · Your leg pain gets worse  · You have numbness or tingling in your leg or toes  · You cannot put any weight on or move your leg  Contact your healthcare provider if:   · Your pain does not decrease, even after treatment  · You have questions or concerns about your condition or care  Medicines:   · NSAIDs , such as ibuprofen, help decrease swelling, pain, and fever  This medicine is available with or without a doctor's order  NSAIDs can cause stomach bleeding or kidney problems in certain people  If you take blood thinner medicine, always ask your healthcare provider if NSAIDs are safe for you  Always read the medicine label and follow directions  · Take your medicine as directed  Contact your healthcare provider if you think your medicine is not helping or if you have side effects  Tell him of her if you are allergic to any medicine  Keep a list of the medicines, vitamins, and herbs you take  Include the amounts, and when and why you take them  Bring the list or the pill bottles to follow-up visits  Carry your medicine list with you in case of an emergency  Follow up with your healthcare provider as directed: You may need more tests to find the cause of your leg pain  You may need to see an orthopedic specialist or a physical therapist  Write down your questions so you remember to ask them during your visits  Manage your leg pain:   · Rest  your injured leg so that it can heal  You may need an immobilizer, brace, or splint to limit the movement of your leg  You may need to avoid putting any weight on your leg for at least 48 hours  Return to normal activities as directed      · Ice  the injury for 20 minutes every 4 hours for up to 24 hours, or as directed  Use an ice pack, or put crushed ice in a plastic bag  Cover it with a towel to protect your skin  Ice helps prevent tissue damage and decreases swelling and pain  · Elevate  your injured leg above the level of your heart as often as you can  This will help decrease swelling and pain  If possible, prop your leg on pillows or blankets to keep the area elevated comfortably  · Use assistive devices as directed  You may need to use a cane or crutches  Assistive devices help decrease pain and pressure on your leg when you walk  Ask your healthcare provider for more information about assistive devices and how to use them correctly  · Maintain a healthy weight  Extra body weight can cause pressure and pain in your hip, knee, and ankle joints  Ask your healthcare provider how much you should weigh  Ask him to help you create a weight loss plan if you are overweight  © 2017 [address] [employer] Information is for End User's use only and may not be sold, redistributed or otherwise used for commercial purposes  All illustrations and images included in CareNotes® are the copyrighted property of A D A M , Inc  or [employer]  The above information is an  only  It is not intended as medical advice for individual conditions or treatments  Talk to your doctor, nurse or pharmacist before following any medical regimen to see if it is safe and effective for you  Cellulitis   WHAT YOU NEED TO KNOW:   Cellulitis is a skin infection caused by bacteria  Cellulitis may go away on its own or you may need treatment  Your healthcare provider may draw a [place] around the outside edges of your cellulitis  If your cellulitis spreads, your healthcare provider will see it outside of the [place]  DISCHARGE INSTRUCTIONS:   Call 911 if:   · You have sudden trouble breathing or chest pain      Return to the emergency department if:   · Your wound gets larger and more painful  · You feel a crackling under your skin when you touch it  · You have purple dots or bumps on your skin, or you see bleeding under your skin  · You have new swelling and pain in your legs  · The red, warm, swollen area gets larger  · You see red streaks coming from the infected area  Contact your healthcare provider if:   · You have a fever  · Your fever or pain does not go away or gets worse  · The area does not get smaller after 2 days of antibiotics  · Your skin is flaking or peeling off  · You have questions or concerns about your condition or care  Medicines:   · Antibiotics  help treat the bacterial infection  · NSAIDs , such as ibuprofen, help decrease swelling, pain, and fever  NSAIDs can cause stomach bleeding or kidney problems in certain people  If you take blood thinner medicine, always ask if NSAIDs are safe for you  Always read the medicine label and follow directions  Do not give these medicines to children under 10months of age without direction from your child's healthcare provider  · Acetaminophen  decreases pain and fever  It is available without a doctor's order  Ask how much to take and how often to take it  Follow directions  Read the labels of all other medicines you are using to see if they also contain acetaminophen, or ask your doctor or pharmacist  Acetaminophen can cause liver damage if not taken correctly  Do not use more than 4 grams (4,000 milligrams) total of acetaminophen in one day  · Take your medicine as directed  Contact your healthcare provider if you think your medicine is not helping or if you have side effects  Tell him or her if you are allergic to any medicine  Keep a list of the medicines, vitamins, and herbs you take  Include the amounts, and when and why you take them  Bring the list or the pill bottles to follow-up visits  Carry your medicine list with you in case of an emergency    Self-care:   · Elevate the area above the level of your heart  as often as you can  This will help decrease swelling and pain  Prop the area on pillows or blankets to keep it elevated comfortably  · Clean the area daily until the wound scabs over  Gently wash the area with soap and water  Pat dry  Use dressings as directed  · Place cool or warm, wet cloths on the area as directed  Use clean cloths and clean water  Leave it on the area until the cloth is room temperature  Pat the area dry with a clean, dry cloth  The cloths may help decrease pain  Prevent cellulitis:   · Do not scratch bug bites or areas of injury  You increase your risk for cellulitis by scratching these areas  · Do not share personal items, such as towels, clothing, and razors  · Clean exercise equipment  with germ-killing  before and after you use it  · Wash your hands often  Use soap and water  Wash your hands after you use the bathroom, change a child's diapers, or sneeze  Wash your hands before you prepare or eat food  Use lotion to prevent dry, cracked skin  · Wear pressure stockings as directed  You may be told to wear the stockings if you have peripheral edema  The stockings improve blood flow and decrease swelling  · Treat athlete's foot  This can help prevent the spread of a bacterial skin infection  Follow up with your healthcare provider within 3 days, or as directed: Your healthcare provider will check if your cellulitis is getting better  You may need different medicine  Write down your questions so you remember to ask them during your visits  © 2017 [facility] Information is for End User's use only and may not be sold, redistributed or otherwise used for commercial purposes  All illustrations and images included in CareNotes® are the copyrighted property of [employer]  or [employer]aines  The above information is an  only   It is not intended as medical advice for individual conditions or treatments  Talk to your doctor, nurse or pharmacist before following any medical regimen to see if it is safe and effective for you

## 2018-12-24 ENCOUNTER — HOSPITAL ENCOUNTER (EMERGENCY)
Facility: HOSPITAL | Age: 53
Discharge: HOME | End: 2018-12-24
Attending: EMERGENCY MEDICINE | Admitting: EMERGENCY MEDICINE
Payer: COMMERCIAL

## 2018-12-24 VITALS — TEMPERATURE: 97.9 F

## 2018-12-24 DIAGNOSIS — Z59.00 HOMELESSNESS: Primary | ICD-10-CM

## 2018-12-24 PROCEDURE — 99283 EMERGENCY DEPT VISIT LOW MDM: CPT

## 2018-12-24 SDOH — ECONOMIC STABILITY - HOUSING INSECURITY: HOMELESSNESS UNSPECIFIED: Z59.00

## 2018-12-25 NOTE — ED NOTES
Patient advised [facility] can take patient as a warming shelter and will have to leave at 7:00am, patient is agreeable taxi called and Shawn Tapia notified patient will be coming spoke to Shruthi the night manager     Jackeline Hawkins RN  12/24/18 9970

## 2018-12-25 NOTE — ED PROVIDER NOTES
History  Chief Complaint   Patient presents with   [facility]     pt presents to the ed via ems, after being [facility]  pt states he is homeless     Leg Swelling     pt has bi lateral lower leg swelling, non pitting      57-year-old obese homeless white male with slight MR who presents with his usual complaint of homelessness  Patient fine around hanging out at a [employer]  He was kicked out by the store  This is the patient's 7th visit in a month, 5th visit in 2 weeks  All because of being homeless  Patient does complain of leg pain when he called 911 to bring him to the emergency room  Patient with venous stasis which is chronic and unchanged  No chest pain, no shortness of breath, no nausea or vomiting  History provided by:  Patient and EMS personnel  Medical Problem   Severity:  Unable to specify  Onset quality:  Unable to specify  Timing:  Unable to specify  Progression:  Unable to specify  Chronicity:  Chronic  Associated symptoms: no abdominal pain, no chest pain, no congestion, no cough, no diarrhea, no ear pain, no fatigue, no fever, no headaches, no loss of consciousness, no myalgias, no nausea, no rash, no rhinorrhea, no shortness of breath, no sore throat, no vomiting and no wheezing        Prior to Admission Medications   Prescriptions Last Dose Informant Patient Reported? Taking? cephalexin (KEFLEX) 500 mg capsule   No No   Sig: Take 1 capsule (500 mg total) by mouth every 8 (eight) hours for 7 days   naproxen (NAPROSYN) 500 mg tablet   No No   Sig: Take 1 tablet (500 mg total) by mouth 2 (two) times a day with meals for 5 days      Facility-Administered Medications: None       Past Medical History:   Diagnosis Date    Diabetes mellitus ([facility] [address] )     Homelessness        Past Surgical History:   Procedure Laterality Date    CHOLECYSTECTOMY         History reviewed  No pertinent family history    I have reviewed and agree with the history as documented  Social History   Substance Use Topics    Smoking status: Current Some Day Smoker     Types: Cigarettes    Smokeless tobacco: Never Used    Alcohol use No        Review of Systems   Constitutional: Negative  Negative for fatigue and fever  HENT: Negative  Negative for congestion, ear pain, rhinorrhea and sore throat  Respiratory: Negative  Negative for cough, shortness of breath and wheezing  Cardiovascular: Positive for leg swelling  Negative for chest pain  Gastrointestinal: Negative  Negative for abdominal pain, diarrhea, nausea and vomiting  Genitourinary: Negative  Musculoskeletal: Negative  Negative for myalgias  Skin: Negative  Negative for rash  Neurological: Negative  Negative for loss of consciousness and headaches  Hematological: Negative  Psychiatric/Behavioral: Negative  All other systems reviewed and are negative  Physical Exam  Physical Exam   Constitutional: He is oriented to person, place, and time  He appears well-developed and well-nourished  HENT:   Head: Normocephalic and atraumatic  Right Ear: External ear normal    Left Ear: External ear normal    Nose: Nose normal    Mouth/Throat: Oropharynx is clear and moist    Eyes: Conjunctivae and EOM are normal    Neck: Normal range of motion  Neck supple  Cardiovascular: Normal rate, regular rhythm, normal heart sounds and intact distal pulses  Pulmonary/Chest: Effort normal and breath sounds normal    Abdominal: Soft  Bowel sounds are normal    Musculoskeletal: Normal range of motion  He exhibits edema  Neurological: He is alert and oriented to person, place, and time  Skin: Skin is warm and dry  Capillary refill takes less than 2 seconds  Peripheral vascular disease with venous stasis changes bilateral lower extremities   Psychiatric: He has a normal mood and affect  His behavior is normal  Judgment and thought content normal    Nursing note and vitals reviewed        Vital Signs  ED Triage Vitals [12/24/18 2153]   Temperature Pulse Resp BP SpO2   97 9 °F (36 6 °C) -- -- -- --      Temp Source Heart Rate Source Patient Position - Orthostatic VS BP Location FiO2 (%)   Tympanic -- -- -- --      Pain Score       --           There were no vitals filed for this visit  Visual Acuity      ED Medications  Medications - No data to display    Diagnostic Studies  Results Reviewed     None                 No orders to display              Procedures  Procedures       Phone Contacts  ED Phone Contact    ED Course                               MDM  CritCare Time    Disposition  Final diagnoses:   Homelessness     Time reflects when diagnosis was documented in both MDM as applicable and the Disposition within this note     Time User Action Codes Description Comment    12/24/2018 10:18 PM Ouray Rein Add [Z59 0] Homelessness       ED Disposition     ED Disposition Condition Comment    Discharge  Winnie Ratel discharge to home/self care  Condition at discharge: Stable        Follow-up Information     Follow up With Specialties Details Why Contact Info    Zabrina Stephenson MD  In 1 week As needed [address] [address] [phone]            Patient's Medications   Discharge Prescriptions    No medications on file     No discharge procedures on file      ED Provider  Electronically Signed by           Sacha Cordova MD  12/24/18 3188

## 2018-12-26 ENCOUNTER — HOSPITAL ENCOUNTER (EMERGENCY)
Facility: HOSPITAL | Age: 53
Discharge: HOME | End: 2018-12-26
Attending: EMERGENCY MEDICINE
Payer: COMMERCIAL

## 2018-12-26 VITALS
SYSTOLIC BLOOD PRESSURE: 152 MMHG | OXYGEN SATURATION: 98 % | DIASTOLIC BLOOD PRESSURE: 84 MMHG | TEMPERATURE: 98.3 F | HEART RATE: 117 BPM | RESPIRATION RATE: 18 BRPM

## 2018-12-26 DIAGNOSIS — Z59.00 HOMELESS: Primary | ICD-10-CM

## 2018-12-26 PROCEDURE — 99283 EMERGENCY DEPT VISIT LOW MDM: CPT

## 2018-12-26 SDOH — ECONOMIC STABILITY - HOUSING INSECURITY: HOMELESSNESS UNSPECIFIED: Z59.00

## 2018-12-27 NOTE — ED PROVIDER NOTES
History  Chief Complaint   Patient presents with    Leg Pain     patient brought in by bob  Was walking from [facility] when both of his legs started to hurt  States he walked to a motel and they were closed  [address] does not [address] until 2000  States he is "taking an antibiotic, you know penicillin, for the reason people take it"  53 y/o male homeless presents as he has no transportation to get to a shelter  Chronic leg pain no new complaints  History provided by:  Patient   used: No        Prior to Admission Medications   Prescriptions Last Dose Informant Patient Reported? Taking? cephalexin (KEFLEX) 500 mg capsule   No No   Sig: Take 1 capsule (500 mg total) by mouth every 8 (eight) hours for 7 days   naproxen (NAPROSYN) 500 mg tablet   No No   Sig: Take 1 tablet (500 mg total) by mouth 2 (two) times a day with meals for 5 days      Facility-Administered Medications: None       Past Medical History:   Diagnosis Date    Diabetes mellitus ([facility] [address] )     Homelessness        Past Surgical History:   Procedure Laterality Date    CHOLECYSTECTOMY         History reviewed  No pertinent family history  I have reviewed and agree with the history as documented  Social History   Substance Use Topics    Smoking status: Current Some Day Smoker     Packs/day: 1 00     Types: Cigarettes    Smokeless tobacco: Never Used    Alcohol use No        Review of Systems   All other systems reviewed and are negative  Physical Exam  Physical Exam   Constitutional: He is oriented to person, place, and time  He appears well-developed and well-nourished  HENT:   Head: Normocephalic and atraumatic  Eyes: Pupils are equal, round, and reactive to light  EOM are normal    Neck: Normal range of motion  Neck supple  Cardiovascular: Normal rate and regular rhythm  Pulmonary/Chest: Effort normal and breath sounds normal    Abdominal: Soft   Bowel sounds are normal  Musculoskeletal: Normal range of motion  He exhibits edema  He exhibits no tenderness or deformity  Neurological: He is alert and oriented to person, place, and time  Skin: Skin is warm and dry  Psychiatric: He has a normal mood and affect  Nursing note and vitals reviewed  Vital Signs  ED Triage Vitals [12/26/18 1836]   Temperature Pulse Respirations Blood Pressure SpO2   98 3 °F (36 8 °C) (!) 117 18 152/84 98 %      Temp Source Heart Rate Source Patient Position - Orthostatic VS BP Location FiO2 (%)   Tympanic Monitor -- -- --      Pain Score       --           Vitals:    12/26/18 1836   BP: 152/84   Pulse: (!) 117       Visual Acuity      ED Medications  Medications - No data to display    Diagnostic Studies  Results Reviewed     None                 No orders to display              Procedures  Procedures       Phone Contacts  ED Phone Contact    ED Course                               MDM  Number of Diagnoses or Management Options  Diagnosis management comments: Patient discharged with appropriate instructions, and follow up  Patient verbalized understanding of instructions, had no further questions at the time of discharge  Patient had stable vital signs, and well appearing at discharge  Patient Progress  Patient progress: stable    CritCare Time    Disposition  Final diagnoses:   Homeless     Time reflects when diagnosis was documented in both MDM as applicable and the Disposition within this note     Time User Action Codes Description Comment    12/26/2018  7:36 PM Tia Ortiz Cancer Add [Z59 0] Homeless       ED Disposition     ED Disposition Condition Comment    Discharge  Skippy Climes discharge to home/self care      Condition at discharge: Stable        Follow-up Information     Follow up With Specialties Details Why Contact Info Additional Information    Zuleika Simms MD  Schedule an appointment as soon as possible for a visit  [address] [facility] [address] [facility] Emergency Department Emergency Medicine  If symptoms worsen [address] [address]  [phone] [facility] ED, [place], [place], [address]          Patient's Medications   Discharge Prescriptions    No medications on file     No discharge procedures on file      ED Provider  Electronically Signed by           Brit Yuan DO  12/26/18 1930

## 2018-12-27 NOTE — ED NOTES
ER staff asked PES about "[address]" in [place] - unknown to PES but found a phone number and this information was given to Rn who called - wasn't open  Patient agreeable to return to [facility]  [facility] called but no LYFT services available at this time  Rn supervisor was contacted for a cab slip which she authorized  Vitaly Briseno called for transport about 20:05

## 2018-12-27 NOTE — DISCHARGE INSTRUCTIONS
Leg Pain   WHAT YOU NEED TO KNOW:   Leg pain may be caused by a variety of health conditions  Your tests did not show any broken bones or blood clots  DISCHARGE INSTRUCTIONS:   Return to the emergency department if:   · You have a fever  · Your leg starts to swell  · Your leg pain gets worse  · You have numbness or tingling in your leg or toes  · You cannot put any weight on or move your leg  Contact your healthcare provider if:   · Your pain does not decrease, even after treatment  · You have questions or concerns about your condition or care  Medicines:   · NSAIDs , such as ibuprofen, help decrease swelling, pain, and fever  This medicine is available with or without a doctor's order  NSAIDs can cause stomach bleeding or kidney problems in certain people  If you take blood thinner medicine, always ask your healthcare provider if NSAIDs are safe for you  Always read the medicine label and follow directions  · Take your medicine as directed  Contact your healthcare provider if you think your medicine is not helping or if you have side effects  Tell him of her if you are allergic to any medicine  Keep a list of the medicines, vitamins, and herbs you take  Include the amounts, and when and why you take them  Bring the list or the pill bottles to follow-up visits  Carry your medicine list with you in case of an emergency  Follow up with your healthcare provider as directed: You may need more tests to find the cause of your leg pain  You may need to see an orthopedic specialist or a physical therapist  Write down your questions so you remember to ask them during your visits  Manage your leg pain:   · Rest  your injured leg so that it can heal  You may need an immobilizer, brace, or splint to limit the movement of your leg  You may need to avoid putting any weight on your leg for at least 48 hours  Return to normal activities as directed      · Ice  the injury for 20 minutes every 4 hours for up to 24 hours, or as directed  Use an ice pack, or put crushed ice in a plastic bag  Cover it with a towel to protect your skin  Ice helps prevent tissue damage and decreases swelling and pain  · Elevate  your injured leg above the level of your heart as often as you can  This will help decrease swelling and pain  If possible, prop your leg on pillows or blankets to keep the area elevated comfortably  · Use assistive devices as directed  You may need to use a cane or crutches  Assistive devices help decrease pain and pressure on your leg when you walk  Ask your healthcare provider for more information about assistive devices and how to use them correctly  · Maintain a healthy weight  Extra body weight can cause pressure and pain in your hip, knee, and ankle joints  Ask your healthcare provider how much you should weigh  Ask him to help you create a weight loss plan if you are overweight  © 2017 [employer] Information is for End User's use only and may not be sold, redistributed or otherwise used for commercial purposes  All illustrations and images included in CareNotes® are the copyrighted property of [employer]  or [employer]  The above information is an  only  It is not intended as medical advice for individual conditions or treatments  Talk to your doctor, nurse or pharmacist before following any medical regimen to see if it is safe and effective for you

## 2018-12-27 NOTE — ED NOTES
Pt discharged, requesting this RN to call "[id number]" in [place], [place] investigated and found phone number but no answer, pt requesting police or ambulance to take him to [place], explained to pt  That these services are used for emergencies only  Pt states he will go to [facility], attempt to call [facility] for ruthy Browning RN  12/26/18 0062

## 2019-01-04 ENCOUNTER — HOSPITAL ENCOUNTER (EMERGENCY)
Facility: HOSPITAL | Age: 54
Discharge: HOME | End: 2019-01-05
Attending: EMERGENCY MEDICINE
Payer: COMMERCIAL

## 2019-01-04 ENCOUNTER — HOSPITAL ENCOUNTER (EMERGENCY)
Facility: HOSPITAL | Age: 54
Discharge: HOME | End: 2019-01-04
Attending: EMERGENCY MEDICINE
Payer: COMMERCIAL

## 2019-01-04 VITALS
RESPIRATION RATE: 21 BRPM | OXYGEN SATURATION: 96 % | TEMPERATURE: 98.2 F | HEART RATE: 110 BPM | SYSTOLIC BLOOD PRESSURE: 143 MMHG | DIASTOLIC BLOOD PRESSURE: 86 MMHG

## 2019-01-04 VITALS
TEMPERATURE: 98 F | WEIGHT: 279.98 LBS | OXYGEN SATURATION: 98 % | BODY MASS INDEX: 43.94 KG/M2 | SYSTOLIC BLOOD PRESSURE: 170 MMHG | HEART RATE: 86 BPM | DIASTOLIC BLOOD PRESSURE: 90 MMHG | HEIGHT: 67 IN | RESPIRATION RATE: 16 BRPM

## 2019-01-04 DIAGNOSIS — R60.0 BILATERAL LEG EDEMA: Primary | ICD-10-CM

## 2019-01-04 DIAGNOSIS — Z59.00 HOMELESS: Primary | ICD-10-CM

## 2019-01-04 LAB — GLUCOSE SERPL-MCNC: 240 MG/DL (ref 65–140)

## 2019-01-04 PROCEDURE — 99283 EMERGENCY DEPT VISIT LOW MDM: CPT

## 2019-01-04 PROCEDURE — 82948 REAGENT STRIP/BLOOD GLUCOSE: CPT

## 2019-01-04 PROCEDURE — 99284 EMERGENCY DEPT VISIT MOD MDM: CPT

## 2019-01-04 SDOH — ECONOMIC STABILITY - HOUSING INSECURITY: HOMELESSNESS UNSPECIFIED: Z59.00

## 2019-01-04 NOTE — ED PROVIDER NOTES
History  Chief Complaint   Patient presents with    Leg Swelling     Pt c/o leg swelling, was visiting [place] today and walked around all day  Pt also states he got kicked out of the house he was staying in  Pt in ER with c/o b/l LE edema - chronic and worse since he walked around [employer] today  He denies trauma  Pt is homeless  None       Past Medical History:   Diagnosis Date    Diabetes mellitus ([facility] [address] )     Homelessness        Past Surgical History:   Procedure Laterality Date    CHOLECYSTECTOMY         History reviewed  No pertinent family history  I have reviewed and agree with the history as documented  Social History   Substance Use Topics    Smoking status: Current Some Day Smoker     Packs/day: 1 00     Types: Cigarettes    Smokeless tobacco: Never Used    Alcohol use No        Review of Systems   Constitutional: Negative for chills and fever  Respiratory: Negative for cough, shortness of breath and wheezing  Cardiovascular: Negative for chest pain and palpitations  Gastrointestinal: Negative for abdominal pain, constipation, diarrhea, nausea and vomiting  Genitourinary: Negative for dysuria, flank pain, hematuria and urgency  Musculoskeletal: Positive for gait problem  Negative for back pain  Skin: Negative for color change, pallor, rash and wound  All other systems reviewed and are negative  Physical Exam  Physical Exam   Constitutional: He is oriented to person, place, and time  He appears well-developed and well-nourished  HENT:   Head: Normocephalic and atraumatic  Eyes: Pupils are equal, round, and reactive to light  EOM are normal    Cardiovascular: Normal rate, regular rhythm and normal heart sounds  Pulmonary/Chest: Effort normal and breath sounds normal    Abdominal: Soft  Bowel sounds are normal  He exhibits no distension and no mass  There is no tenderness  There is no rebound and no guarding  Musculoskeletal: He exhibits edema     B/l LE non pitting edema  Neurological: He is alert and oriented to person, place, and time  Skin: Skin is warm and dry  Psychiatric: He has a normal mood and affect  His behavior is normal  Judgment and thought content normal    Nursing note and vitals reviewed  Vital Signs  ED Triage Vitals [01/04/19 0333]   Temperature Pulse Respirations Blood Pressure SpO2   98 °F (36 7 °C) 86 16 170/90 98 %      Temp Source Heart Rate Source Patient Position - Orthostatic VS BP Location FiO2 (%)   Tympanic Monitor Sitting Left arm --      Pain Score       5           Vitals:    01/04/19 0333   BP: 170/90   Pulse: 86   Patient Position - Orthostatic VS: Sitting       Visual Acuity      ED Medications  Medications - No data to display    Diagnostic Studies  Results Reviewed     None                 No orders to display              Procedures  Procedures       Phone Contacts  ED Phone Contact    ED Course                               MDM  Number of Diagnoses or Management Options  Bilateral leg edema:   Diagnosis management comments: Pt requests that his legs be wrapped up with an ACE bandage  He is stable at the time of d/c  Risk of Complications, Morbidity, and/or Mortality  Presenting problems: low  Diagnostic procedures: low  Management options: low    Patient Progress  Patient progress: stable    CritCare Time    Disposition  Final diagnoses:   Bilateral leg edema     Time reflects when diagnosis was documented in both MDM as applicable and the Disposition within this note     Time User Action Codes Description Comment    1/4/2019  4:05 AM Chris Arndt Add [R60 0] Bilateral leg edema       ED Disposition     ED Disposition Condition Comment    Discharge  Pari Fail discharge to home/self care      Condition at discharge: Stable        Follow-up Information     Follow up With Specialties Details Why Contact Info    Osmel Hallman MD  Schedule an appointment as soon as possible for a visit in 1 day for follow up [address] [address] [phone]            There are no discharge medications for this patient  No discharge procedures on file      ED Provider  Electronically Signed by           Stefan Cutler DO  01/08/19 1155

## 2019-01-04 NOTE — ED PROVIDER NOTES
History  Chief Complaint   Patient presents with    Chills     pt c/o having chills since this afternoon  also states he "took more than i was supposed to of my blood pressure meds"  just filled 3 days ago  HPI     Patient is a 57-year-old male with history of type 2 diabetes presents via EMS for evaluation and treatment  Initially he told EMS that he was having chills and that he took more of his blood pressure medication and normal   To me, patient states he just told them that they would transport him to the emergency department  He denies any medical complaints to me  He states that he was taking a bus today to handle a disorderly conduct charge when he came back to [place] and missed the deadline to get into the homeless shelter  He denies any medical complaints, denies taking too many medications  None       Past Medical History:   Diagnosis Date    Diabetes mellitus ([facility] [address] )     Homelessness        Past Surgical History:   Procedure Laterality Date    CHOLECYSTECTOMY         History reviewed  No pertinent family history  I have reviewed and agree with the history as documented  Social History   Substance Use Topics    Smoking status: Current Some Day Smoker     Packs/day: 1 00     Types: Cigarettes    Smokeless tobacco: Never Used    Alcohol use No        Review of Systems   Constitutional: Negative for activity change, appetite change, chills, fatigue and fever  HENT: Negative for congestion, dental problem, facial swelling, sore throat, tinnitus and trouble swallowing  Eyes: Negative for pain, discharge and itching  Respiratory: Negative for apnea, chest tightness and wheezing  Cardiovascular: Negative for chest pain, palpitations and leg swelling  Gastrointestinal: Negative for abdominal pain and nausea  Genitourinary: Negative for difficulty urinating, dysuria and flank pain     Musculoskeletal: Negative for arthralgias, back pain, gait problem, joint swelling and neck pain  Skin: Negative for color change, rash and wound  Neurological: Negative for dizziness and facial asymmetry  Psychiatric/Behavioral: Negative for agitation and behavioral problems  The patient is not nervous/anxious  All other systems reviewed and are negative  Physical Exam  Physical Exam   Constitutional: He is oriented to person, place, and time  He appears well-developed and well-nourished  No distress  HENT:   Head: Normocephalic and atraumatic  Right Ear: External ear normal    Left Ear: External ear normal    Eyes: Pupils are equal, round, and reactive to light  EOM are normal  Right eye exhibits no discharge  Left eye exhibits no discharge  Neck: Normal range of motion  Neck supple  No tracheal deviation present  No thyromegaly present  Cardiovascular: Normal rate and regular rhythm  No murmur heard  Pulmonary/Chest: Effort normal and breath sounds normal    Abdominal: Soft  Bowel sounds are normal  He exhibits no distension  There is no tenderness  Musculoskeletal: Normal range of motion  He exhibits no edema or deformity  Neurological: He is alert and oriented to person, place, and time  No cranial nerve deficit  He exhibits normal muscle tone  Skin: Skin is warm  Capillary refill takes less than 2 seconds  He is not diaphoretic  Psychiatric: He has a normal mood and affect  His behavior is normal    Nursing note and vitals reviewed        Vital Signs  ED Triage Vitals [01/04/19 1805]   Temperature Pulse Respirations Blood Pressure SpO2   98 2 °F (36 8 °C) (!) 110 21 143/86 96 %      Temp Source Heart Rate Source Patient Position - Orthostatic VS BP Location FiO2 (%)   Oral Monitor -- Right arm --      Pain Score       --           Vitals:    01/04/19 1805   BP: 143/86   Pulse: (!) 110       Visual Acuity      ED Medications  Medications - No data to display    Diagnostic Studies  Results Reviewed     Procedure Component Value Units Date/Time    Fingerstick Glucose (POCT) [[id number]]  (Abnormal) Collected:  01/04/19 1808    Lab Status:  Final result Updated:  01/04/19 1810     POC Glucose 240 (H) mg/dl                  No orders to display              Procedures  Procedures       Phone Contacts  ED Phone Contact    ED Course                               MDM  Number of Diagnoses or Management Options  Homeless: new and does not require workup  Diagnosis management comments: Patient with no medical complaints  Here because the home with shelters closed for the evening  Denies taking too many medications, denies any fevers or chills  Denies any chest pain or abdominal pain  Blood sugar 240s  Patient medically clear for discharge  Has already been seen and evaluated by PES  Has been offered extensive resources to home with shelters and community resources but has yet to follow through  I had a henri discussion with patient  Will allow him to stay in emergency department tonight as to not discharge him into the cold however I told patient that he has to start taking responsibility for himself and final place stable for it is too late in the evening  Patient is alert, oriented and states understanding of this and will be discharged in the morning to be able to catch the bus         Amount and/or Complexity of Data Reviewed  Clinical lab tests: ordered and reviewed    Risk of Complications, Morbidity, and/or Mortality  Presenting problems: low  Diagnostic procedures: low  Management options: low    Patient Progress  Patient progress: stable    CritCare Time    Disposition  Final diagnoses:   Homeless     Time reflects when diagnosis was documented in both MDM as applicable and the Disposition within this note     Time User Action Codes Description Comment    1/4/2019  7:23 PM Naomi Knight Add [Z59 0] Homeless       ED Disposition     None      Follow-up Information     Follow up With Specialties Details Why Contact Info    Juancarlos Comer MD    [address] [place] [address] [place]  [address] [place] [id number] [phone]            Patient's Medications    No medications on file     No discharge procedures on file      ED Provider  Electronically Signed by           Holly Beckwith MD  01/04/19 2030

## 2019-01-05 ENCOUNTER — HOSPITAL ENCOUNTER (EMERGENCY)
Facility: HOSPITAL | Age: 54
Discharge: HOME | End: 2019-01-06
Attending: EMERGENCY MEDICINE | Admitting: EMERGENCY MEDICINE
Payer: COMMERCIAL

## 2019-01-05 VITALS
DIASTOLIC BLOOD PRESSURE: 76 MMHG | SYSTOLIC BLOOD PRESSURE: 126 MMHG | TEMPERATURE: 98.3 F | RESPIRATION RATE: 20 BRPM | HEART RATE: 92 BPM | OXYGEN SATURATION: 95 %

## 2019-01-05 DIAGNOSIS — Z59.00 HOMELESS: Primary | ICD-10-CM

## 2019-01-05 PROCEDURE — 99283 EMERGENCY DEPT VISIT LOW MDM: CPT

## 2019-01-05 PROCEDURE — 82948 REAGENT STRIP/BLOOD GLUCOSE: CPT

## 2019-01-05 SDOH — ECONOMIC STABILITY - HOUSING INSECURITY: HOMELESSNESS UNSPECIFIED: Z59.00

## 2019-01-05 NOTE — ED NOTES
Patient made comfortable in room,blankets given,call bell is in reach,side rails are up  No signs of distress and no complaints noted at this time       Raheem Marquez RN  01/04/19 1950

## 2019-01-05 NOTE — ED NOTES
Patient requested to have a urinal to void,and given  Fluids were given earlier  No distress noted and no complaints offered       Zeeshan Arizmendi RN  01/05/19 2946

## 2019-01-05 NOTE — ED NOTES
Patient noted to be asleep in room at this time with no signs of distress noted       Unique Will RN  01/04/19 1599

## 2019-01-05 NOTE — DISCHARGE INSTRUCTIONS
Type 2 Diabetes in Adults   WHAT YOU NEED TO KNOW:   Type 2 diabetes is a disease that affects how your body uses glucose (sugar)  Normally, when the blood sugar level increases, the pancreas makes more insulin  Insulin helps move sugar out of the blood so it can be used for energy  Type 2 diabetes develops because either the body cannot make enough insulin, or it cannot use the insulin correctly  After many years, your pancreas may stop making insulin  DISCHARGE INSTRUCTIONS:   Call 911 for any of the following:   · You have any of the following signs of a stroke:      ¨ Numbness or drooping on one side of your face     ¨ Weakness in an arm or leg    ¨ Confusion or difficulty speaking    ¨ Dizziness, a severe headache, or vision loss    · You have any of the following signs of a heart attack:      ¨ Squeezing, pressure, or pain in your chest that lasts longer than 5 minutes or returns    ¨ Discomfort or pain in your back, neck, jaw, stomach, or arm     ¨ Trouble breathing    ¨ Nausea or vomiting    ¨ Lightheadedness or a sudden cold sweat, especially with chest pain or trouble breathing  Seek care immediately if:   · You have severe abdominal pain, or the pain spreads to your back  You may also be vomiting  · You have trouble staying awake or focusing  · You are shaking or sweating  · You have blurred or double vision  · Your breath has a fruity, sweet smell  · Your breathing is deep and labored, or rapid and shallow  · Your heartbeat is fast and weak  Contact your healthcare provider if:   · You are vomiting or have diarrhea  · You have an upset stomach and cannot eat the foods on your meal plan  · You feel weak or more tired than usual      · You feel dizzy, have headaches, or are easily irritated  · Your skin is red, warm, dry, or swollen  · You have a wound that does not heal      · You have numbness in your arms or legs       · You have trouble coping with your illness, or you feel anxious or depressed  · You have questions or concerns about your condition or care  Medicines: You may  need any of the following:  · Hypoglycemic medicines or insulin  may be given to decrease the amount of sugar in your blood  · Blood pressure medicine  may be given to lower your blood pressure  Your blood pressure should be less than 140/90  · Cholesterol lowering medicine  may be given to prevent heart disease  · Antiplatelets , such as aspirin, help prevent blood clots  Take your antiplatelet medicine exactly as directed  These medicines make it more likely for you to bleed or bruise  If you are told to take aspirin, do not take acetaminophen or ibuprofen instead  · Take your medicine as directed  Contact your healthcare provider if you think your medicine is not helping or if you have side effects  Tell him or her if you are allergic to any medicine  Keep a list of the medicines, vitamins, and herbs you take  Include the amounts, and when and why you take them  Bring the list or the pill bottles to follow-up visits  Carry your medicine list with you in case of an emergency  Check your blood sugar level as directed: You will be taught how to use a glucose monitor  Ask your healthcare provider when and how often to check during the day  You will need to check your blood sugar level at least 3 times each day if you are on insulin  If you check your blood sugar level before a meal , it should be between 80 and 130 mg/dL  If you check your blood sugar level 1 to 2 hours after a meal , it should be less than 180 mg/dL  Ask your healthcare provider if these are good goals for you  Write down your results, and show them to your healthcare provider  He may use the results to make changes to your medicine, food, or exercise schedules  If your blood sugar level is too low: Your blood sugar level is too low if it goes below 70 mg/dL   If the level is too low, eat or drink 15 grams of fast-acting carbohydrate  These are found naturally in fruits  Fast-acting carbohydrates will raise your blood sugar level quickly  Examples of 15 grams of fast-acting carbohydrate are 4 ounces (½ cup) of fruit juice or 4 ounces of regular soda  Other examples are 2 tablespoons of raisins or 3 to 4 glucose tablets  Check your blood sugar level 15 minutes later  If the level is still low (less than 100 mg/dL), eat another 15 grams of carbohydrate  When the level returns to 100 mg/dL, eat a snack or meal that contains carbohydrates  This will help prevent another drop in blood sugar  Always carefully follow your healthcare provider's instructions on how to treat low blood sugar levels  Wear medical alert identification:  Wear medical alert jewelry or carry a card that says you have diabetes  Ask your healthcare provider where to get these items  Check your feet each day for sores:  Wear shoes and socks that fit correctly  Do not trim your toenails  Ask your healthcare provider for more information about foot care  Maintain a healthy weight:  Ask your healthcare provider how much you should weigh  A healthy weight can help you control your diabetes  Ask your provider to help you create a weight loss plan if you are overweight  Together you can set manageable weight loss goals  Follow your meal plan:  A dietitian will help you make a meal plan to keep your blood sugar level steady  Do not skip meals  Your blood sugar level may drop too low if you have taken diabetes medicine and do not eat  · Keep track of carbohydrates (sugar and starchy foods)  Your blood sugar level can get too high if you eat too many carbohydrates  Your dietitian will help you plan meals and snacks that have the right amount of carbohydrates  · Eat low-fat foods , such as skinless chicken and low-fat milk  · Eat less sodium (salt)  Limit high-sodium foods, such as soy sauce, potato chips, and soup  Do not add salt to food you cook  Limit your use of table salt  You should have less than 2,300 mg of sodium per day  · Eat high-fiber foods , such as vegetables, whole grain breads, and beans  · Limit alcohol  Alcohol affects your blood sugar level and can make it harder to manage your diabetes  Limit alcohol to 1 drink a day if you are a woman  Limit alcohol to 2 drinks a day if you are a man  A drink of alcohol is 12 ounces of beer, 5 ounces of wine, or 1½ ounces of liquor  Exercise as directed:  Exercise can help keep your blood sugar level steady, decrease your risk of heart disease, and help you lose weight  Stretch before and after you exercise  Exercise for at least 150 minutes every week  Spread this amount of exercise over at least 3 days a week  Do not skip exercise more than 2 days in a row  Include muscle strengthening activities 2 to 3 days each week  Older adults should include balance training 2 to 3 times each week  Activities that help increase balance include yoga and melissa chi  Work with your healthcare provider to create an exercise plan  · Check your blood sugar level before and after exercise  Healthcare providers may tell you to change the amount of insulin you take or food you eat  If your blood sugar level is high, check your blood or urine for ketones before you exercise  Do not exercise if your blood sugar level is high and you have ketones  · If your blood sugar level is less than 100 mg/dL, have a carbohydrate snack before you exercise  Examples are 4 to 6 crackers, ½ banana, 8 ounces (1 cup) of milk, or 4 ounces (½ cup) of juice  Drink water or liquids that do not contain sugar before, during, and after exercise  Ask your dietitian or healthcare provider which liquids you should drink when you exercise  · Do not sit for longer than 30 minutes  If you cannot walk around, at least stand up  This will help you stay active and keep your blood circulating    Do not smoke: Nicotine and other chemicals in cigarettes and cigars can cause lung damage and make it more difficult to manage your diabetes  Ask your healthcare provider for information if you currently smoke and need help to quit  Do not use e-cigarettes or smokeless tobacco in place of cigarettes or to help you quit  They still contain nicotine  Check your blood pressure as directed:  Ask your healthcare provider what your blood pressure should be  Most adults with diabetes and high blood pressure should have a systolic blood pressure (first number) less than 140  Your diastolic blood pressure (second number) should be less than 90  Ask about vaccines: You have a higher risk for serious illness if you get the flu, pneumonia, or hepatitis  Ask your healthcare provider if you should get a flu, pneumonia, or hepatitis B vaccine, and when to get the vaccine  Follow up with your healthcare provider as directed: You may need to return to have your A1c checked every 3 months  You will need to return at least once each year to have your feet checked  You will need an eye exam once a year to check for retinopathy  You will also need urine tests every year to check for kidney problems  You may need tests to monitor for heart disease such as an EKG, stress test, blood pressure monitoring, and blood tests  Write down your questions so you remember to ask them during your visits  © 2017 [employer]  Information is for End User's use only and may not be sold, redistributed or otherwise used for commercial purposes  All illustrations and images included in CareNotes® are the copyrighted property of [employer]  or [employer]  The above information is an  only  It is not intended as medical advice for individual conditions or treatments  Talk to your doctor, nurse or pharmacist before following any medical regimen to see if it is safe and effective for you

## 2019-01-05 NOTE — ED NOTES
Pt continues to sleep, to remain in ER until am for discharge when it's light outside     Kristin Ospina RN  01/05/19 0907
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
## 2019-01-11 ENCOUNTER — HOSPITAL ENCOUNTER (EMERGENCY)
Facility: HOSPITAL | Age: 54
Discharge: HOME | End: 2019-01-11
Attending: EMERGENCY MEDICINE | Admitting: EMERGENCY MEDICINE
Payer: COMMERCIAL

## 2019-01-11 VITALS
WEIGHT: 279 LBS | BODY MASS INDEX: 43.7 KG/M2 | OXYGEN SATURATION: 96 % | DIASTOLIC BLOOD PRESSURE: 86 MMHG | RESPIRATION RATE: 20 BRPM | HEART RATE: 117 BPM | SYSTOLIC BLOOD PRESSURE: 146 MMHG | TEMPERATURE: 99.2 F

## 2019-01-11 DIAGNOSIS — M79.89 LEG SWELLING: Primary | ICD-10-CM

## 2019-01-11 LAB — GLUCOSE SERPL-MCNC: 166 MG/DL (ref 65–140)

## 2019-01-11 PROCEDURE — 82948 REAGENT STRIP/BLOOD GLUCOSE: CPT

## 2019-01-11 PROCEDURE — 99283 EMERGENCY DEPT VISIT LOW MDM: CPT

## 2019-01-11 NOTE — ED NOTES
PT reports feeling shaky and seeing EMS at [facility] getting coffee  Pt reports feeling anxious at times and eats icing when anxious  Pt denies any SOb, nausea, pain, or discomfort         Julio Vick RN  01/11/19 5158

## 2019-01-11 NOTE — DISCHARGE INSTRUCTIONS
Please follow-up with your family doctor which he already have an appointment for next week regarding her leg swelling    Leg Edema   WHAT YOU NEED TO KNOW:   Leg edema is swelling caused by fluid buildup  Your legs may swell if you sit or stand for long periods of time, are pregnant, or are injured  Swelling may also occur if you have heart failure or circulation problems  This means that your heart does not pump blood through your body as it should  DISCHARGE INSTRUCTIONS:   Self-care:   · Elevate your legs:  Raise your legs above the level of your heart as often as you can  This will help decrease swelling and pain  Prop your legs on pillows or blankets to keep them elevated comfortably  · Wear pressure stockings: These tight stockings put pressure on your legs to promote blood flow and prevent blood clots  Wear the stockings during the day  Do not wear them while you sleep  · Apply heat:  Heat helps decrease pain and swelling  Apply heat on the area for 20 to 30 minutes every 2 hours for as many days as directed  · Stay active:  Do not stand or sit for long periods of time  Ask your healthcare provider about the best exercise plan for you  · Eat healthy foods:  Healthy foods include fruits, vegetables, whole-grain breads, low-fat dairy products, beans, lean meats, and fish  Ask if you need to be on a special diet  Limit salt  Salt will make your body hold even more fluid  Follow up with your healthcare provider as directed:  Write down your questions so you remember to ask them during your visits  Contact your healthcare provider if:   · You have a fever or feel more tired than usual     · The veins in your legs look larger than usual  They may look full or bulging  · Your legs itch or feel heavy  · You have red or white areas or sores on your legs  The skin may also appear dimpled or have indentations  · You are gaining weight  · You have trouble moving your ankles      · The swelling does not go away, or other parts of your body swell  · You have questions or concerns about your condition or care  Return to the emergency department if:   · You cannot walk  · You feel faint or confused  · Your skin turns blue or gray  · Your leg feels warm, tender, and painful  It may be swollen and red  · You have chest pain or trouble breathing that is worse when you lie down  · You suddenly feel lightheaded and have trouble breathing  · You have new and sudden chest pain  You may have more pain when you take deep breaths or cough  You may also cough up blood  © 2017 [employer]  Information is for End User's use only and may not be sold, redistributed or otherwise used for commercial purposes  All illustrations and images included in CareNotes® are the copyrighted property of [employer]  or [employer]  The above information is an  only  It is not intended as medical advice for individual conditions or treatments  Talk to your doctor, nurse or pharmacist before following any medical regimen to see if it is safe and effective for you

## 2019-01-11 NOTE — ED PROVIDER NOTES
History  Chief Complaint   Patient presents with    Leg Swelling     Pt flagged down ALS at [facility], c/o hands shaking while putting top on coffee cup, pt also states he has bilateral swelling  HPI    20-year-old male that presents today with bilateral leg swelling  Patient states he was at while wall when he started feeling cold in his hands were shaking  He states this often happens when he gets very anxious and he eats icing which helps  Patient is a diabetic his fingerstick glucose is normal   Patient states he has bilateral swelling of his lower extremity which he has an appointment with his PCP next week  Patient states that was too cold outside so he came to the ED  No other complaints of chest pain shortness of breath  20-year-old male that presents today with bilateral leg swelling  Patient is in no acute distress  Patient is slightly tachycardic but he has been tachycardic before  Patient has chronic changes in his lower extremities with swelling  Patient is the had numerous visits to the emergency department regarding being homeless  Patient does not have a place to stay  He spends the night here and is discharged in the morning  Will discharge patient patient does not require any blood work  None       Past Medical History:   Diagnosis Date    Diabetes mellitus ([address] )     Homelessness        Past Surgical History:   Procedure Laterality Date    CHOLECYSTECTOMY         History reviewed  No pertinent family history  I have reviewed and agree with the history as documented  Social History   Substance Use Topics    Smoking status: Current Some Day Smoker     Packs/day: 0 50     Types: Cigarettes    Smokeless tobacco: Never Used    Alcohol use No        Review of Systems   Constitutional: Negative  Negative for diaphoresis and fever  HENT: Negative  Respiratory: Negative  Negative for cough, shortness of breath and wheezing  Cardiovascular: Negative    Negative for chest pain, palpitations and leg swelling  Gastrointestinal: Negative for abdominal distention, abdominal pain, nausea and vomiting  Genitourinary: Negative  Musculoskeletal: Negative  Skin: Negative  Neurological: Negative  Psychiatric/Behavioral: Negative  All other systems reviewed and are negative  Physical Exam  Physical Exam   Constitutional: He is oriented to person, place, and time  He appears well-developed and well-nourished  No distress  HENT:   Head: Normocephalic and atraumatic  Nose: Nose normal    Mouth/Throat: Oropharynx is clear and moist    Eyes: Pupils are equal, round, and reactive to light  Conjunctivae and EOM are normal    Neck: Normal range of motion  Neck supple  Cardiovascular: Normal rate, regular rhythm and normal heart sounds  No murmur heard  Pulmonary/Chest: Effort normal and breath sounds normal  No respiratory distress  He has no wheezes  He has no rales  Abdominal: Soft  Bowel sounds are normal  He exhibits no distension  There is no tenderness  There is no rebound and no guarding  Musculoskeletal: Normal range of motion  He exhibits edema  He exhibits no tenderness or deformity  Chronic lower extremity edema 2+ DP pulses   Neurological: He is alert and oriented to person, place, and time  No cranial nerve deficit  Skin: Skin is warm and dry  No rash noted  He is not diaphoretic  No pallor  Psychiatric: He has a normal mood and affect  Vitals reviewed        Vital Signs  ED Triage Vitals [01/11/19 1740]   Temperature Pulse Respirations Blood Pressure SpO2   99 2 °F (37 3 °C) (!) 117 20 146/86 96 %      Temp Source Heart Rate Source Patient Position - Orthostatic VS BP Location FiO2 (%)   Tympanic Monitor Lying Right arm --      Pain Score       No Pain           Vitals:    01/11/19 1740   BP: 146/86   Pulse: (!) 117   Patient Position - Orthostatic VS: Lying       Visual Acuity      ED Medications  Medications - No data to display    Diagnostic Studies  Results Reviewed     Procedure Component Value Units Date/Time    Fingerstick Glucose (POCT) [[id number]]  (Abnormal) Collected:  01/11/19 1748    Lab Status:  Final result Updated:  01/11/19 1750     POC Glucose 166 (H) mg/dl                  No orders to display              Procedures  Procedures       Phone Contacts  ED Phone Contact    ED Course                               MDM  CritCare Time    Disposition  Final diagnoses:   Leg swelling     Time reflects when diagnosis was documented in both MDM as applicable and the Disposition within this note     Time User Action Codes Description Comment    1/11/2019  6:03 PM Diego Wooten U  8  [M79 89] Leg swelling       ED Disposition     ED Disposition Condition Comment    Discharge  Sanyay Rachana discharge to home/self care  Condition at discharge: Good        Follow-up Information     Follow up With Specialties Details Why Contact Info    Kimberly Nelson MD  Schedule an appointment as soon as possible for a visit  [address] [address] [phone]            There are no discharge medications for this patient  No discharge procedures on file      ED Provider  Electronically Signed by           Rahel Davison MD  01/11/19 3088

## 2019-01-11 NOTE — ED NOTES
Pt apologetic for coming to the hospital   Pt asking frequently if pt can be given prescription for something for anxiety  Pt encouraged to f/u with primary care physician  Pt verbalizes understanding         Lázaro Zavaleta RN  01/11/19 [facility] [address], RN  01/11/19 5087

## 2019-01-18 NOTE — ED PROVIDER NOTES
History  Chief Complaint   Patient presents with   Cullen Guo Homeless     pt seen here this am for homelessness, was given resources for [employer] in [facility] pt was kicked out of [facility] [place] he could go to [facility], pt called 911 [place] c/o bilat leg pain    Leg Pain     53 y/o wm well known to ED, homeless and despite all our efforts to get him help and placement still returns to the ER regularly at night for a place to eat and sleep  Tonight patient was kicked out of the [place] and 911 was again called for B/L leg pain  History provided by:  Patient  Leg Pain   Location:  Leg  Injury: no    Leg location:  L lower leg and R lower leg  Pain details:     Quality:  Dull, pressure and aching    Radiates to:  Does not radiate    Severity:  Mild    Onset quality:  Gradual    Duration: years  Timing:  Constant    Progression:  Unchanged  Chronicity:  Chronic  Dislocation: no    Foreign body present:  No foreign bodies  Prior injury to area:  No  Relieved by:  None tried  Worsened by:  Nothing  Ineffective treatments:  None tried  Associated symptoms: swelling    Associated symptoms: no back pain and no fever    Risk factors: obesity        None       Past Medical History:   Diagnosis Date    Diabetes mellitus ([facility] [place] [address] )     Homelessness        Past Surgical History:   Procedure Laterality Date    CHOLECYSTECTOMY         History reviewed  No pertinent family history  I have reviewed and agree with the history as documented  Social History   Substance Use Topics    Smoking status: Current Some Day Smoker     Packs/day: 0 50     Types: Cigarettes    Smokeless tobacco: Never Used    Alcohol use No        Review of Systems   Constitutional: Negative for chills and fever  HENT: Negative  Respiratory: Negative  Cardiovascular: Positive for leg swelling  Genitourinary: Negative      Musculoskeletal: Negative for back pain  B/L leg pain   Skin: Negative  Neurological: Negative  Hematological: Negative  Psychiatric/Behavioral: Negative  All other systems reviewed and are negative  Physical Exam  Physical Exam   Constitutional: He is oriented to person, place, and time  He appears well-developed and well-nourished  HENT:   Head: Normocephalic and atraumatic  Right Ear: External ear normal    Left Ear: External ear normal    Nose: Nose normal    Mouth/Throat: Oropharynx is clear and moist    Eyes: Conjunctivae and EOM are normal    Neck: Normal range of motion  Neck supple  Cardiovascular: Normal rate, regular rhythm, normal heart sounds and intact distal pulses  Pulmonary/Chest: Effort normal and breath sounds normal    Abdominal: Soft  Bowel sounds are normal    Musculoskeletal: He exhibits edema  Neurological: He is alert and oriented to person, place, and time  Skin: Skin is warm and dry  Capillary refill takes less than 2 seconds  B/L PVD changes   Psychiatric: He has a normal mood and affect  His behavior is normal  Thought content normal    Nursing note and vitals reviewed        Vital Signs  ED Triage Vitals   Temperature Pulse Respirations Blood Pressure SpO2   12/19/18 0253 12/19/18 0253 12/19/18 0253 12/19/18 0253 12/19/18 0253   (!) 97 3 °F (36 3 °C) 98 18 151/74 98 %      Temp Source Heart Rate Source Patient Position - Orthostatic VS BP Location FiO2 (%)   12/19/18 0253 12/19/18 0253 12/19/18 0253 12/19/18 0253 --   Tympanic Monitor Lying Left arm       Pain Score       12/19/18 1008       No Pain           Vitals:    12/19/18 0253 12/19/18 1008   BP: 151/74 147/90   Pulse: 98 90   Patient Position - Orthostatic VS: Lying Sitting       Visual Acuity      ED Medications  Medications - No data to display    Diagnostic Studies  Results Reviewed     None                 No orders to display              Procedures  Procedures       Phone Contacts  ED Phone Contact    ED Course MDM  CritCare Time    Disposition  Final diagnoses:   Homelessness     Time reflects when diagnosis was documented in both MDM as applicable and the Disposition within this note     Time User Action Codes Description Comment    12/19/2018  5:01 AM Chepe Tovar Add [Z59 0] Homelessness       ED Disposition     ED Disposition Condition Comment    Discharge  Hal Romero discharge to home/self care  Condition at discharge: Good        Follow-up Information     Follow up With Specialties Details Why Contact Info    Jordan Martinez MD  Schedule an appointment as soon as possible for a visit in 1 week As needed [address] [address] [phone]            There are no discharge medications for this patient  No discharge procedures on file      ED Provider  Electronically Signed by           Chante Paz MD  01/18/19 7778

## 2019-01-20 LAB — GLUCOSE SERPL-MCNC: 101 MG/DL (ref 65–140)

## 2019-01-29 ENCOUNTER — HOSPITAL ENCOUNTER (EMERGENCY)
Facility: HOSPITAL | Age: 54
Discharge: HOME | End: 2019-01-29
Attending: EMERGENCY MEDICINE
Payer: COMMERCIAL

## 2019-01-29 VITALS
TEMPERATURE: 96.9 F | OXYGEN SATURATION: 98 % | SYSTOLIC BLOOD PRESSURE: 155 MMHG | DIASTOLIC BLOOD PRESSURE: 85 MMHG | RESPIRATION RATE: 20 BRPM | HEART RATE: 99 BPM

## 2019-01-29 DIAGNOSIS — Z59.00 HOMELESS: Primary | ICD-10-CM

## 2019-01-29 DIAGNOSIS — R60.9 CHRONIC EDEMA: ICD-10-CM

## 2019-01-29 PROCEDURE — 99283 EMERGENCY DEPT VISIT LOW MDM: CPT

## 2019-01-29 SDOH — ECONOMIC STABILITY - HOUSING INSECURITY: HOMELESSNESS UNSPECIFIED: Z59.00
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
## 2019-01-29 NOTE — ED NOTES
Family guidance called, notified that patient is medically cleared      Roderick Chakraborty RN  01/29/19 8501

## 2019-01-29 NOTE — ED NOTES
Wrench  Small pocket knife  Lighter x2  Metformin  Wallet  Brown bag closed  Boots  Pants  Shirts  Coats  LOCKED IN LOCKER [address]  01/29/19 2856

## 2019-02-01 ENCOUNTER — HOSPITAL ENCOUNTER (EMERGENCY)
Facility: HOSPITAL | Age: 54
Discharge: HOME | End: 2019-02-01
Attending: EMERGENCY MEDICINE
Payer: COMMERCIAL

## 2019-02-01 VITALS
DIASTOLIC BLOOD PRESSURE: 77 MMHG | WEIGHT: 279.98 LBS | HEART RATE: 91 BPM | RESPIRATION RATE: 16 BRPM | TEMPERATURE: 97.3 F | BODY MASS INDEX: 43.85 KG/M2 | OXYGEN SATURATION: 98 % | SYSTOLIC BLOOD PRESSURE: 134 MMHG

## 2019-02-01 DIAGNOSIS — T69.9XXA COLD EXPOSURE, INITIAL ENCOUNTER: Primary | ICD-10-CM

## 2019-02-01 LAB
ALBUMIN SERPL BCP-MCNC: 4.1 G/DL (ref 3–5.2)
ALP SERPL-CCNC: 72 U/L (ref 43–122)
ALT SERPL W P-5'-P-CCNC: 25 U/L (ref 9–52)
ANION GAP SERPL CALCULATED.3IONS-SCNC: 8 MMOL/L (ref 5–14)
AST SERPL W P-5'-P-CCNC: 18 U/L (ref 17–59)
BASOPHILS # BLD AUTO: 0.1 THOUSANDS/ΜL (ref 0–0.1)
BASOPHILS NFR BLD AUTO: 1 % (ref 0–1)
BILIRUB SERPL-MCNC: 0.6 MG/DL
BUN SERPL-MCNC: 11 MG/DL (ref 5–25)
CALCIUM SERPL-MCNC: 9.2 MG/DL (ref 8.4–10.2)
CHLORIDE SERPL-SCNC: 104 MMOL/L (ref 97–108)
CO2 SERPL-SCNC: 23 MMOL/L (ref 22–30)
CREAT SERPL-MCNC: 0.58 MG/DL (ref 0.7–1.5)
EOSINOPHIL # BLD AUTO: 0.1 THOUSAND/ΜL (ref 0–0.4)
EOSINOPHIL NFR BLD AUTO: 1 % (ref 0–6)
ERYTHROCYTE [DISTWIDTH] IN BLOOD BY AUTOMATED COUNT: 14.1 %
GFR SERPL CREATININE-BSD FRML MDRD: 114 ML/MIN/1.73SQ M
GLUCOSE SERPL-MCNC: 135 MG/DL (ref 70–99)
HCT VFR BLD AUTO: 40.6 % (ref 41–53)
HGB BLD-MCNC: 13.8 G/DL (ref 13.5–17.5)
LYMPHOCYTES # BLD AUTO: 1.6 THOUSANDS/ΜL (ref 0.5–4)
LYMPHOCYTES NFR BLD AUTO: 20 % (ref 25–45)
MCH RBC QN AUTO: 28.9 PG (ref 26–34)
MCHC RBC AUTO-ENTMCNC: 33.9 G/DL (ref 31–36)
MCV RBC AUTO: 85 FL (ref 80–100)
MONOCYTES # BLD AUTO: 0.7 THOUSAND/ΜL (ref 0.2–0.9)
MONOCYTES NFR BLD AUTO: 9 % (ref 1–10)
NEUTROPHILS # BLD AUTO: 5.5 THOUSANDS/ΜL (ref 1.8–7.8)
NEUTS SEG NFR BLD AUTO: 69 % (ref 45–65)
PLATELET # BLD AUTO: 198 THOUSANDS/UL (ref 150–450)
PMV BLD AUTO: 9.6 FL (ref 8.9–12.7)
POTASSIUM SERPL-SCNC: 4 MMOL/L (ref 3.6–5)
PROT SERPL-MCNC: 7 G/DL (ref 5.9–8.4)
RBC # BLD AUTO: 4.76 MILLION/UL (ref 4.5–5.9)
SODIUM SERPL-SCNC: 135 MMOL/L (ref 137–147)
WBC # BLD AUTO: 8 THOUSAND/UL (ref 4.5–11)

## 2019-02-01 PROCEDURE — 36415 COLL VENOUS BLD VENIPUNCTURE: CPT | Performed by: EMERGENCY MEDICINE

## 2019-02-01 PROCEDURE — 85025 COMPLETE CBC W/AUTO DIFF WBC: CPT | Performed by: EMERGENCY MEDICINE

## 2019-02-01 PROCEDURE — 80053 COMPREHEN METABOLIC PANEL: CPT | Performed by: EMERGENCY MEDICINE

## 2019-02-01 PROCEDURE — 99284 EMERGENCY DEPT VISIT MOD MDM: CPT

## 2019-02-01 NOTE — ED NOTES
Pts clothes now finished being washed/dried  Returned to pt upon discharge       Katy Laurent RN  02/01/19 0244

## 2019-02-01 NOTE — ED NOTES
This RN provided patient clear bag for loose belongings and also provided lunch tray to go  Patient pleased and left ED with steady gait and without incident       Ronn Hill RN  02/01/19 5912

## 2019-02-01 NOTE — DISCHARGE INSTRUCTIONS
[facility] offers a warming shelter  [address] [place] [address]    Return if fevers, chills, chest pain or shortness of breath

## 2019-02-01 NOTE — ED NOTES
Clothing taken sent to housekeeping to be washed - all pockets  Searched prior to be sent to laundry - pt   Consents for clothes to be washed-       Mihai Singh RN  02/01/19 4560

## 2019-02-01 NOTE — ED NOTES
Pt  States that he was told by EMS that he needs to have something to eat and spend 2 days in the hospital      Alexis Shelby RN  02/01/19 8745

## 2019-02-01 NOTE — ED PROVIDER NOTES
History  Chief Complaint   Patient presents with    Cold Exposure     brought in by EMS after spending the night in an elevator - currently homeless      This is a 66-year-old male presents emergency department after being found in an elevator  The patient states that he had been outside all night in the cold  He is homeless  Patient is stating that he tried to go to a shelter with a needed a voucher and he was unsure where to get that from  Patient states that he is a diabetic and takes metformin  He has these pills  He also states that he has hypertension and does not have those medications  No headache  No vision changes  No chest pain  No shortness of breath  No fevers or chills  No cough  Patient states he is cold and hungry  No aggravating or alleviating factors other than those noted  No radiation of symptoms  Symptoms are moderate severity  Will check blood work to ensure no electrolyte abnormalities and that glucose is in reasonable control  None       Past Medical History:   Diagnosis Date    Diabetes mellitus ([facility] [address] )     Homelessness        Past Surgical History:   Procedure Laterality Date    CHOLECYSTECTOMY         No family history on file  I have reviewed and agree with the history as documented  Social History     Tobacco Use    Smoking status: Current Some Day Smoker     Packs/day: 0 50     Types: Cigarettes    Smokeless tobacco: Never Used   Substance Use Topics    Alcohol use: No    Drug use: Yes     Types: Marijuana        Review of Systems   Constitutional: Negative for activity change, appetite change and fever  HENT: Negative for congestion, ear pain, rhinorrhea and sore throat  Eyes: Negative for pain, discharge and redness  Respiratory: Negative for cough, shortness of breath and wheezing  Cardiovascular: Negative for chest pain and palpitations  Gastrointestinal: Negative for abdominal pain, diarrhea, nausea and vomiting     Endocrine: Negative for polyuria  Genitourinary: Negative for difficulty urinating, dysuria, frequency and urgency  Musculoskeletal: Negative for arthralgias and myalgias  Skin: Negative for color change and rash  Allergic/Immunologic: Negative for immunocompromised state  Neurological: Negative for dizziness, syncope and light-headedness  Hematological: Does not bruise/bleed easily  Psychiatric/Behavioral: Negative for confusion  All other systems reviewed and are negative  Physical Exam  Physical Exam   Constitutional: He is oriented to person, place, and time  He appears well-developed and well-nourished  No distress  HENT:   Head: Normocephalic and atraumatic  Nose: Nose normal    Eyes: Conjunctivae are normal  No scleral icterus  Neck: Normal range of motion  Neck supple  Cardiovascular: Normal rate, regular rhythm, normal heart sounds and intact distal pulses  Pulmonary/Chest: Effort normal and breath sounds normal  No stridor  No respiratory distress  He has no wheezes  Abdominal: Soft  He exhibits no distension  There is no tenderness  There is no rebound and no guarding  Musculoskeletal: He exhibits no edema, tenderness or deformity  Neurological: He is alert and oriented to person, place, and time  He exhibits normal muscle tone  Coordination normal    Skin: Skin is warm and dry  No rash noted  No erythema  No pallor  Chronic peripheral vascular disease changes in the lower extremities  No cellulitis  No open wounds  Dry skin  Psychiatric: He has a normal mood and affect  His behavior is normal  Thought content normal    Nursing note and vitals reviewed        Vital Signs  ED Triage Vitals [02/01/19 0709]   Temperature Pulse Respirations Blood Pressure SpO2   (!) 97 3 °F (36 3 °C) 98 20 141/96 95 %      Temp Source Heart Rate Source Patient Position - Orthostatic VS BP Location FiO2 (%)   Oral Monitor Sitting Left arm --      Pain Score       --           Vitals: 02/01/19 0709 02/01/19 1228   BP: 141/96 134/77   Pulse: 98 91   Patient Position - Orthostatic VS: Sitting Lying       Visual Acuity      ED Medications  Medications - No data to display    Diagnostic Studies  Results Reviewed     Procedure Component Value Units Date/Time    Comprehensive metabolic panel [[id number]]  (Abnormal) Collected:  02/01/19 0746    Lab Status:  Final result Specimen:  Blood from Arm, Right Updated:  02/01/19 0801     Sodium 135 mmol/L      Potassium 4 0 mmol/L      Chloride 104 mmol/L      CO2 23 mmol/L      ANION GAP 8 mmol/L      BUN 11 mg/dL      Creatinine 0 58 mg/dL      Glucose 135 mg/dL      Calcium 9 2 mg/dL      AST 18 U/L      ALT 25 U/L      Alkaline Phosphatase 72 U/L      Total Protein 7 0 g/dL      Albumin 4 1 g/dL      Total Bilirubin 0 60 mg/dL      eGFR 114 ml/min/1 73sq m     Narrative:         National Kidney Disease Education Program recommendations are as follows:  GFR calculation is accurate only with a steady state creatinine  Chronic Kidney disease less than 60 ml/min/1 73 sq  meters  Kidney failure less than 15 ml/min/1 73 sq  meters      CBC and differential [[id number]]  (Abnormal) Collected:  02/01/19 0746    Lab Status:  Final result Specimen:  Blood from Arm, Right Updated:  02/01/19 0758     WBC 8 00 Thousand/uL      RBC 4 76 Million/uL      Hemoglobin 13 8 g/dL      Hematocrit 40 6 %      MCV 85 fL      MCH 28 9 pg      MCHC 33 9 g/dL      RDW 14 1 %      MPV 9 6 fL      Platelets 283 Thousands/uL      Neutrophils Relative 69 %      Lymphocytes Relative 20 %      Monocytes Relative 9 %      Eosinophils Relative 1 %      Basophils Relative 1 %      Neutrophils Absolute 5 50 Thousands/µL      Lymphocytes Absolute 1 60 Thousands/µL      Monocytes Absolute 0 70 Thousand/µL      Eosinophils Absolute 0 10 Thousand/µL      Basophils Absolute 0 10 Thousands/µL                  No orders to display              Procedures  Procedures       Phone Contacts  ED Phone Contact    ED Course                               MDM  Number of Diagnoses or Management Options  Diagnosis management comments: Chart review of last year does not reveal any antihypertensive medications  No evidence of primary care visit  The patient has been referred several times to primary care for further follow-up  Amount and/or Complexity of Data Reviewed  Decide to obtain previous medical records or to obtain history from someone other than the patient: yes        Disposition  Final diagnoses:   Cold exposure, initial encounter     Time reflects when diagnosis was documented in both MDM as applicable and the Disposition within this note     Time User Action Codes Description Comment    2/1/2019  8:29 AM Jhonny Romo [T69  9XXA] Cold exposure, initial encounter       ED Disposition     ED Disposition Condition Date/Time Comment    Discharge  Fri Feb 1, 2019  8:29 AM Rosalind Ness discharge to home/self care  Condition at discharge: Stable        Follow-up Information     Follow up With Specialties Details Why Contact Info    Giovanna Valdez MD  In 1 week  [address] [phone]             There are no discharge medications for this patient  No discharge procedures on file      ED Provider  Electronically Signed by           Rico Andrews MD  02/18/19 2199

## 2019-02-01 NOTE — ED NOTES
Pt  States that he is not familiar with [place] - normally is in [place] area - got a ride yesterday from a unknown man - was told that he needed to get a voucher but didn't know where the police station was      Bo Parkinson, CORA  02/01/19 7588

## 2019-02-03 ENCOUNTER — HOSPITAL ENCOUNTER (EMERGENCY)
Facility: HOSPITAL | Age: 54
Discharge: HOME | End: 2019-02-03
Attending: EMERGENCY MEDICINE
Payer: COMMERCIAL

## 2019-02-03 VITALS
TEMPERATURE: 98.4 F | HEART RATE: 110 BPM | SYSTOLIC BLOOD PRESSURE: 170 MMHG | OXYGEN SATURATION: 96 % | DIASTOLIC BLOOD PRESSURE: 83 MMHG

## 2019-02-03 DIAGNOSIS — Z59.00 HOMELESSNESS: Primary | ICD-10-CM

## 2019-02-03 DIAGNOSIS — R60.9 DEPENDENT EDEMA: ICD-10-CM

## 2019-02-03 PROCEDURE — 99283 EMERGENCY DEPT VISIT LOW MDM: CPT

## 2019-02-03 SDOH — ECONOMIC STABILITY - HOUSING INSECURITY: HOMELESSNESS UNSPECIFIED: Z59.00

## 2019-02-03 NOTE — ED PROVIDER NOTES
History  Chief Complaint   Patient presents with    Leg Swelling     Patient states his legs are swollen, states "I think I have frost bite " Patient has extensive history of leg swelling  HPI    None       Past Medical History:   Diagnosis Date    Diabetes mellitus ([address] )     Homelessness        Past Surgical History:   Procedure Laterality Date    CHOLECYSTECTOMY         History reviewed  No pertinent family history  I have reviewed and agree with the history as documented      Social History   Substance Use Topics    Smoking status: Current Some Day Smoker     Packs/day: 0 50     Types: Cigarettes    Smokeless tobacco: Never Used    Alcohol use No        Review of Systems    Physical Exam  Physical Exam    Vital Signs  ED Triage Vitals [02/03/19 0408]   Temperature Pulse Resp Blood Pressure SpO2   98 4 °F (36 9 °C) (!) 110 -- 170/83 96 %      Temp Source Heart Rate Source Patient Position - Orthostatic VS BP Location FiO2 (%)   Tympanic -- Sitting Right arm --      Pain Score       --           Vitals:    02/03/19 0408   BP: 170/83   Pulse: (!) 110   Patient Position - Orthostatic VS: Sitting       Visual Acuity      ED Medications  Medications - No data to display    Diagnostic Studies  Results Reviewed     None                 No orders to display              Procedures  Procedures       Phone Contacts  ED Phone Contact    ED Course                               MDM  Number of Diagnoses or Management Options  Dependent edema:   Homelessness:   Diagnosis management comments: Patient does not have frostbite      Disposition  Final diagnoses:   Homelessness   Dependent edema     Time reflects when diagnosis was documented in both MDM as applicable and the Disposition within this note     Time User Action Codes Description Comment    2/3/2019  7:05 AM Angie Romo [Z59 0] Homelessness     2/3/2019  7:06 AM Angie Romo [R60 9] Dependent edema       ED Disposition     ED Disposition Condition Date/Time Comment    Discharge  Sun Feb 3, 2019  7:06 AM Dione Reich discharge to home/self care  Condition at discharge: Stable        Follow-up Information     Follow up With Specialties Details Why Contact Info    Kimberly Nelson MD    [address] [id number] [phone]            Patient's Medications    No medications on file     No discharge procedures on file      ED Provider  Electronically Signed by           Chico Raymundo MD  02/03/19 6640

## 2019-02-08 ENCOUNTER — HOSPITAL ENCOUNTER (EMERGENCY)
Facility: HOSPITAL | Age: 54
Discharge: HOME | End: 2019-02-08
Attending: EMERGENCY MEDICINE | Admitting: EMERGENCY MEDICINE
Payer: COMMERCIAL

## 2019-02-08 ENCOUNTER — APPOINTMENT (EMERGENCY)
Dept: RADIOLOGY | Facility: HOSPITAL | Age: 54
End: 2019-02-08
Payer: COMMERCIAL

## 2019-02-08 VITALS
TEMPERATURE: 99.7 F | HEART RATE: 102 BPM | SYSTOLIC BLOOD PRESSURE: 141 MMHG | DIASTOLIC BLOOD PRESSURE: 80 MMHG | OXYGEN SATURATION: 98 % | RESPIRATION RATE: 20 BRPM

## 2019-02-08 VITALS
TEMPERATURE: 99.5 F | RESPIRATION RATE: 20 BRPM | SYSTOLIC BLOOD PRESSURE: 131 MMHG | DIASTOLIC BLOOD PRESSURE: 73 MMHG | OXYGEN SATURATION: 97 % | HEART RATE: 106 BPM

## 2019-02-08 DIAGNOSIS — M25.562 ACUTE PAIN OF LEFT KNEE: Primary | ICD-10-CM

## 2019-02-08 DIAGNOSIS — M54.9 BACK PAIN: Primary | ICD-10-CM

## 2019-02-08 LAB
ALBUMIN SERPL BCP-MCNC: 3.6 G/DL (ref 3.5–5)
ALP SERPL-CCNC: 87 U/L (ref 46–116)
ALT SERPL W P-5'-P-CCNC: 21 U/L (ref 12–78)
ANION GAP SERPL CALCULATED.3IONS-SCNC: 11 MMOL/L (ref 4–13)
AST SERPL W P-5'-P-CCNC: 13 U/L (ref 5–45)
BASOPHILS # BLD AUTO: 0.03 THOUSANDS/ΜL (ref 0–0.1)
BASOPHILS NFR BLD AUTO: 1 % (ref 0–1)
BILIRUB SERPL-MCNC: 0.5 MG/DL (ref 0.2–1)
BUN SERPL-MCNC: 12 MG/DL (ref 5–25)
CALCIUM SERPL-MCNC: 9.1 MG/DL (ref 8.3–10.1)
CHLORIDE SERPL-SCNC: 100 MMOL/L (ref 100–108)
CO2 SERPL-SCNC: 24 MMOL/L (ref 21–32)
CREAT SERPL-MCNC: 0.81 MG/DL (ref 0.6–1.3)
EOSINOPHIL # BLD AUTO: 0.04 THOUSAND/ΜL (ref 0–0.61)
EOSINOPHIL NFR BLD AUTO: 1 % (ref 0–6)
ERYTHROCYTE [DISTWIDTH] IN BLOOD BY AUTOMATED COUNT: 13.7 % (ref 11.6–15.1)
GFR SERPL CREATININE-BSD FRML MDRD: 99 ML/MIN/1.73SQ M
GLUCOSE SERPL-MCNC: 100 MG/DL (ref 65–140)
HCT VFR BLD AUTO: 39.5 % (ref 36.5–49.3)
HGB BLD-MCNC: 12.6 G/DL (ref 12–17)
IMM GRANULOCYTES # BLD AUTO: 0.01 THOUSAND/UL (ref 0–0.2)
IMM GRANULOCYTES NFR BLD AUTO: 0 % (ref 0–2)
LYMPHOCYTES # BLD AUTO: 0.76 THOUSANDS/ΜL (ref 0.6–4.47)
LYMPHOCYTES NFR BLD AUTO: 15 % (ref 14–44)
MCH RBC QN AUTO: 28.1 PG (ref 26.8–34.3)
MCHC RBC AUTO-ENTMCNC: 31.9 G/DL (ref 31.4–37.4)
MCV RBC AUTO: 88 FL (ref 82–98)
MONOCYTES # BLD AUTO: 0.56 THOUSAND/ΜL (ref 0.17–1.22)
MONOCYTES NFR BLD AUTO: 11 % (ref 4–12)
NEUTROPHILS # BLD AUTO: 3.53 THOUSANDS/ΜL (ref 1.85–7.62)
NEUTS SEG NFR BLD AUTO: 72 % (ref 43–75)
NRBC BLD AUTO-RTO: 0 /100 WBCS
PLATELET # BLD AUTO: 196 THOUSANDS/UL (ref 149–390)
PMV BLD AUTO: 11.4 FL (ref 8.9–12.7)
POTASSIUM SERPL-SCNC: 3.7 MMOL/L (ref 3.5–5.3)
PROT SERPL-MCNC: 7.2 G/DL (ref 6.4–8.2)
RBC # BLD AUTO: 4.49 MILLION/UL (ref 3.88–5.62)
SODIUM SERPL-SCNC: 135 MMOL/L (ref 136–145)
WBC # BLD AUTO: 4.93 THOUSAND/UL (ref 4.31–10.16)

## 2019-02-08 PROCEDURE — 80053 COMPREHEN METABOLIC PANEL: CPT | Performed by: EMERGENCY MEDICINE

## 2019-02-08 PROCEDURE — 85025 COMPLETE CBC W/AUTO DIFF WBC: CPT | Performed by: EMERGENCY MEDICINE

## 2019-02-08 PROCEDURE — 73564 X-RAY EXAM KNEE 4 OR MORE: CPT

## 2019-02-08 PROCEDURE — 36415 COLL VENOUS BLD VENIPUNCTURE: CPT | Performed by: EMERGENCY MEDICINE

## 2019-02-08 PROCEDURE — 99284 EMERGENCY DEPT VISIT MOD MDM: CPT

## 2019-02-08 PROCEDURE — 99283 EMERGENCY DEPT VISIT LOW MDM: CPT

## 2019-02-08 NOTE — ED PROVIDER NOTES
History  Chief Complaint   Patient presents with    Leg Pain     Per BLS patient was found lying on the ground, complaining of leg pain and diarrhea   state patient kept crying that "nobody helps me "    Diarrhea     47-year-old male who is homeless states that he has pain in his left knee and lower extremity unsure if he fell states that he just needs some ice cream   Patient is awake and alert resting well in the room not in any distress  History provided by:  Patient   used: No        None       Past Medical History:   Diagnosis Date    Diabetes mellitus ([facility] [address] )     Homelessness        Past Surgical History:   Procedure Laterality Date    CHOLECYSTECTOMY         History reviewed  No pertinent family history  I have reviewed and agree with the history as documented  Social History   Substance Use Topics    Smoking status: Current Some Day Smoker     Packs/day: 0 50     Types: Cigarettes    Smokeless tobacco: Never Used    Alcohol use No        Review of Systems   Constitutional: Negative for activity change, chills, diaphoresis and fever  HENT: Negative for congestion, ear pain, nosebleeds, sore throat, trouble swallowing and voice change  Eyes: Negative for pain, discharge and redness  Respiratory: Negative for apnea, cough, choking, shortness of breath, wheezing and stridor  Cardiovascular: Negative for chest pain and palpitations  Gastrointestinal: Negative for abdominal distention, abdominal pain, constipation, diarrhea, nausea and vomiting  Endocrine: Negative for polydipsia  Genitourinary: Negative for difficulty urinating, dysuria, flank pain, frequency, hematuria and urgency  Musculoskeletal: Positive for arthralgias and gait problem  Negative for back pain, joint swelling, myalgias, neck pain and neck stiffness  Skin: Negative for pallor and rash     Neurological: Negative for dizziness, tremors, syncope, speech difficulty, weakness, numbness and headaches  Hematological: Negative for adenopathy  Psychiatric/Behavioral: Negative for confusion, hallucinations, self-injury and suicidal ideas  The patient is not nervous/anxious  Physical Exam  Physical Exam   Constitutional: He is oriented to person, place, and time  Vital signs are normal  He appears well-developed and well-nourished  HENT:   Head: Normocephalic and atraumatic  Nose: Nose normal    Mouth/Throat: Oropharynx is clear and moist    Eyes: Pupils are equal, round, and reactive to light  Conjunctivae and EOM are normal    Neck: Normal range of motion  Neck supple  Cardiovascular: Normal rate  Pulmonary/Chest: Effort normal and breath sounds normal    Abdominal: Soft  Bowel sounds are normal    Musculoskeletal: He exhibits tenderness  Tenderness diffusely over his left knee area  Neurological: He is alert and oriented to person, place, and time  Skin: Skin is warm  Psychiatric: He has a normal mood and affect  Nursing note and vitals reviewed        Vital Signs  ED Triage Vitals [02/08/19 1440]   Temperature Pulse Respirations Blood Pressure SpO2   99 5 °F (37 5 °C) (!) 106 20 131/73 97 %      Temp Source Heart Rate Source Patient Position - Orthostatic VS BP Location FiO2 (%)   Tympanic Monitor Sitting Left arm --      Pain Score       --           Vitals:    02/08/19 1440   BP: 131/73   Pulse: (!) 106   Patient Position - Orthostatic VS: Sitting       Visual Acuity      ED Medications  Medications - No data to display    Diagnostic Studies  Results Reviewed     Procedure Component Value Units Date/Time    Comprehensive metabolic panel [[id number]]  (Abnormal) Collected:  02/08/19 1526    Lab Status:  Final result Specimen:  Blood from Arm, Left Updated:  02/08/19 1547     Sodium 135 (L) mmol/L      Potassium 3 7 mmol/L      Chloride 100 mmol/L      CO2 24 mmol/L      ANION GAP 11 mmol/L      BUN 12 mg/dL      Creatinine 0 81 mg/dL      Glucose 100 mg/dL      Calcium 9 1 mg/dL      AST 13 U/L      ALT 21 U/L      Alkaline Phosphatase 87 U/L      Total Protein 7 2 g/dL      Albumin 3 6 g/dL      Total Bilirubin 0 50 mg/dL      eGFR 99 ml/min/1 73sq m     Narrative:         National Kidney Disease Education Program recommendations are as follows:  GFR calculation is accurate only with a steady state creatinine  Chronic Kidney disease less than 60 ml/min/1 73 sq  meters  Kidney failure less than 15 ml/min/1 73 sq  meters  CBC and differential [[id number]] Collected:  02/08/19 1526    Lab Status:  Final result Specimen:  Blood from Arm, Left Updated:  02/08/19 1531     WBC 4 93 Thousand/uL      RBC 4 49 Million/uL      Hemoglobin 12 6 g/dL      Hematocrit 39 5 %      MCV 88 fL      MCH 28 1 pg      MCHC 31 9 g/dL      RDW 13 7 %      MPV 11 4 fL      Platelets 934 Thousands/uL      nRBC 0 /100 WBCs      Neutrophils Relative 72 %      Immat GRANS % 0 %      Lymphocytes Relative 15 %      Monocytes Relative 11 %      Eosinophils Relative 1 %      Basophils Relative 1 %      Neutrophils Absolute 3 53 Thousands/µL      Immature Grans Absolute 0 01 Thousand/uL      Lymphocytes Absolute 0 76 Thousands/µL      Monocytes Absolute 0 56 Thousand/µL      Eosinophils Absolute 0 04 Thousand/µL      Basophils Absolute 0 03 Thousands/µL                  XR knee 4+ vw left injury    (Results Pending)              Procedures  Procedures       Phone Contacts  ED Phone Contact    ED Course                               MDM    Disposition  Final diagnoses:   Acute pain of left knee     Time reflects when diagnosis was documented in both MDM as applicable and the Disposition within this note     Time User Action Codes Description Comment    2/8/2019  5:56 PM Urban Romo [R73 [id number]] Acute pain of left knee       ED Disposition     ED Disposition Condition Date/Time Comment    Discharge  Fri Feb 8, 2019  5:57 PM Larissa Henry discharge to home/self care      Condition at discharge: Stable Follow-up Information     Follow up With Specialties Details Why [facility] , DO Orthopedic Surgery Schedule an appointment as soon as possible for a visit  [address]  [phone]            There are no discharge medications for this patient  No discharge procedures on file      ED Provider  Electronically Signed by           Kaci Zuluaga DO  02/08/19 3787

## 2019-02-08 NOTE — ED NOTES
Patient came in smelling of diarrhea, upon further examination of patient he had diarrhea everywhere, patient was then taken back to secured [facility] where patient showered and re dressed himself, patient also standing and walking with minimal help         Afsaneh Patel  02/08/19 0752 [facility]  02/08/19 5814

## 2019-02-08 NOTE — DISCHARGE INSTRUCTIONS
Arthralgia   WHAT YOU NEED TO KNOW:   Arthralgia is pain in one or more joints, with no inflammation  It may be short-term and get better within 6 to 8 weeks  Arthralgia can be an early sign of arthritis  Arthralgia may be caused by a medical condition, such as a hormone disorder or a tumor  It may also be caused by an infection or injury  DISCHARGE INSTRUCTIONS:   Medicines: The following medicines may  be ordered for you:  · Acetaminophen  decreases pain  Ask how much to take and how often to take it  Follow directions  Acetaminophen can cause liver damage if not taken correctly  · NSAIDs  decrease pain and prevent swelling  Ask your healthcare provider which medicine is right for you  Ask how much to take and when to take it  Take as directed  NSAIDs can cause stomach bleeding and kidney problems if not taken correctly  · Pain relief cream  decreases pain  Use this cream as directed  · Take your medicine as directed  Contact your healthcare provider if you think your medicine is not helping or if you have side effects  Tell him of her if you are allergic to any medicine  Keep a list of the medicines, vitamins, and herbs you take  Include the amounts, and when and why you take them  Bring the list or the pill bottles to follow-up visits  Carry your medicine list with you in case of an emergency  Follow up with your healthcare provider or specialist as directed:  Write down your questions so you remember to ask them during your visits  Self-care:   · Apply heat  to help decrease pain  Use a heating pad or heat wrap  Apply heat for 20 to 30 minutes every 2 hours for as many days as directed  · Rest  as much as possible  Avoid activities that cause joint pain  · Apply ice  to help decrease swelling and pain  Ice may also help prevent tissue damage  Use an ice pack, or put crushed ice in a plastic bag   Cover it with a towel and place it on your painful joint for 15 to 20 minutes every hour or as directed  · Support  the joint with a brace or elastic wrap as directed  · Elevate  your joint above the level of your heart as often as you can to help decrease swelling and pain  Prop your painful joint on pillows or blankets to keep it elevated comfortably  · Lose weight  if you are overweight  Extra weight can put pressure on your joints and cause more pain  Ask your healthcare provider how much you should weigh  Ask him to help you create a weight loss plan  · Exercise  regularly to help improve joint movement and to decrease pain  Ask about the best exercise plan for you  Low-impact exercises can help take the pressure off your joints  Examples are walking, swimming, and water aerobics  Physical therapy:  A physical therapist teaches you exercises to help improve movement and strength, and to decrease pain  Ask your healthcare provider if physical therapy is right for you  Contact your healthcare provider or specialist if:   · You have a fever  · You continue to have joint pain that cannot be relieved with heat, ice, or medicine  · You have pain and inflammation around your joint  · You have questions or concerns about your condition or care  Return to the emergency department if:   · You have sudden, severe pain when you move your joint  · You have a fever and shaking chills  · You cannot move your joint  · You lose feeling on the side of your body where you have the painful joint  © 2017 [employer]  Information is for End User's use only and may not be sold, redistributed or otherwise used for commercial purposes  All illustrations and images included in CareNotes® are the copyrighted property of A [employer] A M [employer]  or [employer]  The above information is an  only  It is not intended as medical advice for individual conditions or treatments   Talk to your doctor, nurse or pharmacist before following any medical regimen to see if it is safe and effective for you
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
## 2019-02-09 NOTE — DISCHARGE INSTRUCTIONS
Back Pain   WHAT YOU NEED TO KNOW:   Back pain is common  It can be caused by many conditions, such as arthritis or the breakdown of spinal discs  Your risk for back pain is increased by injuries, lack of activity, or repeated bending and twisting  You may feel sore or stiff on one or both sides of your back  The pain may spread to your buttocks or thighs  DISCHARGE INSTRUCTIONS:   Medicines:   · NSAIDs  help decrease swelling and pain  This medicine is available with or without a doctor's order  NSAIDs can cause stomach bleeding or kidney problems in certain people  If you take blood thinner medicine, always ask your healthcare provider if NSAIDs are safe for you  Always read the medicine label and follow directions  · Acetaminophen  decreases pain  It is available without a doctor's order  Ask how much to take and how often to take it  Follow directions  Acetaminophen can cause liver damage if not taken correctly  · Prescription pain medicine  may be given  Ask your healthcare provider how to take this medicine safely  · Take your medicine as directed  Contact your healthcare provider if you think your medicine is not helping or if you have side effects  Tell him or her if you are allergic to any medicine  Keep a list of the medicines, vitamins, and herbs you take  Include the amounts, and when and why you take them  Bring the list or the pill bottles to follow-up visits  Carry your medicine list with you in case of an emergency  Follow up with your healthcare provider in 2 weeks, or as directed:  Write down your questions so you remember to ask them during your visits  How to manage your back pain:   · Apply ice  on your back or affected area for 15 to 20 minutes every hour or as directed  Use an ice pack, or put crushed ice in a plastic bag  Cover it with a towel  Ice helps prevent tissue damage and decreases pain      · Apply heat  on your back or affected area for 20 to 30 minutes every 2 hours for as many days as directed  Heat helps decrease pain and muscle spasms  · Stay active  as much as you can without causing more pain  Bed rest could make your back pain worse  Avoid heavy lifting until your pain is gone  Return to the emergency department if:   · You have pain, numbness, or weakness in one or both legs  · Your pain becomes so severe that you cannot walk  · You cannot control your urine or bowel movements  · You have severe back pain with chest pain  · You have severe back pain, nausea, and vomiting  · You have severe back pain that spreads to your side or genital area  Contact your healthcare provider if:   · You have back pain that does not get better with rest and pain medicine  · You have a fever  · You have pain that worsens when you are on your back or when you rest     · You have pain that worsens when you cough or sneeze  · You lose weight without trying  · You have questions or concerns about your condition or care  © 2017 [address] [employer] Information is for End User's use only and may not be sold, redistributed or otherwise used for commercial purposes  All illustrations and images included in CareNotes® are the copyrighted property of [employer]  or [employer]  The above information is an  only  It is not intended as medical advice for individual conditions or treatments  Talk to your doctor, nurse or pharmacist before following any medical regimen to see if it is safe and effective for you

## 2019-02-14 ENCOUNTER — HOSPITAL ENCOUNTER (EMERGENCY)
Facility: HOSPITAL | Age: 54
Discharge: HOME | End: 2019-02-14
Attending: EMERGENCY MEDICINE
Payer: COMMERCIAL

## 2019-02-14 VITALS
OXYGEN SATURATION: 97 % | SYSTOLIC BLOOD PRESSURE: 141 MMHG | HEART RATE: 104 BPM | RESPIRATION RATE: 18 BRPM | DIASTOLIC BLOOD PRESSURE: 76 MMHG | TEMPERATURE: 99.1 F

## 2019-02-14 DIAGNOSIS — M79.605 BILATERAL LEG PAIN: Primary | ICD-10-CM

## 2019-02-14 DIAGNOSIS — M79.604 BILATERAL LEG PAIN: Primary | ICD-10-CM

## 2019-02-14 PROCEDURE — 99283 EMERGENCY DEPT VISIT LOW MDM: CPT

## 2019-02-15 NOTE — ED PROVIDER NOTES
History  Chief Complaint   Patient presents with    Leg Pain     States he was told by a  in [place] to have his legs checked out  Patient is homeless but comes with his friend , he states he has a ride today and money  HPI    53-year-old male that presents with leg pain  Patient states he was going to [facility] when a  in [place] told him that he should get his legs checked out  Patient is homeless but states he now has a ride and money  States he has is chronic leg pain from chronic leg swelling  Patient states he thinks it was anything wrong with his leg but wanted to get checked out because the  told  Denies any fevers or chills no chest pain shortness of breath  No difficulty with ambulation  Patient has been ambulating in the department  62 year male that presents with leg pain  No signs of cellulitis  2+ DP pulses  Discussed with patient about hygiene and washing his feet  Patient understands will discharge patient    None       Past Medical History:   Diagnosis Date    Diabetes mellitus ([facility] [address] )     Homelessness        Past Surgical History:   Procedure Laterality Date    CHOLECYSTECTOMY         History reviewed  No pertinent family history  I have reviewed and agree with the history as documented  Social History     Tobacco Use    Smoking status: Current Some Day Smoker     Packs/day: 0 50     Types: Cigarettes    Smokeless tobacco: Never Used   Substance Use Topics    Alcohol use: No    Drug use: Yes     Types: Marijuana        Review of Systems   Constitutional: Negative  Negative for diaphoresis and fever  HENT: Negative  Respiratory: Negative  Negative for cough, shortness of breath and wheezing  Cardiovascular: Negative  Negative for chest pain, palpitations and leg swelling  Gastrointestinal: Negative for abdominal distention, abdominal pain, nausea and vomiting  Genitourinary: Negative  Musculoskeletal: Negative  Skin: Negative  Neurological: Negative  Psychiatric/Behavioral: Negative  All other systems reviewed and are negative  Physical Exam  Physical Exam   Constitutional: He is oriented to person, place, and time  He appears well-developed and well-nourished  No distress  HENT:   Head: Normocephalic and atraumatic  Nose: Nose normal    Mouth/Throat: Oropharynx is clear and moist    Eyes: Pupils are equal, round, and reactive to light  Conjunctivae and EOM are normal    Neck: Normal range of motion  Neck supple  Cardiovascular: Normal rate, regular rhythm and normal heart sounds  No murmur heard  Pulmonary/Chest: Effort normal and breath sounds normal  No respiratory distress  He has no wheezes  He has no rales  Abdominal: Soft  Bowel sounds are normal  He exhibits no distension  There is no tenderness  There is no rebound and no guarding  Musculoskeletal: Normal range of motion  He exhibits no edema, tenderness or deformity  No tenderness on exam   No sinus cellulitis  2+ DP pulses  Chronic changes from edema  Neurological: He is alert and oriented to person, place, and time  No cranial nerve deficit  Skin: Skin is warm and dry  No rash noted  He is not diaphoretic  No pallor  Psychiatric: He has a normal mood and affect  Vitals reviewed        Vital Signs  ED Triage Vitals [02/14/19 2224]   Temperature Pulse Respirations Blood Pressure SpO2   99 1 °F (37 3 °C) 104 18 141/76 97 %      Temp Source Heart Rate Source Patient Position - Orthostatic VS BP Location FiO2 (%)   Tympanic Monitor Sitting Left arm --      Pain Score       3           Vitals:    02/14/19 2224   BP: 141/76   Pulse: 104   Patient Position - Orthostatic VS: Sitting       Visual Acuity      ED Medications  Medications - No data to display    Diagnostic Studies  Results Reviewed     None                 No orders to display              Procedures  Procedures       Phone Contacts  ED Phone Contact    ED Course MDM    Disposition  Final diagnoses:   Bilateral leg pain     Time reflects when diagnosis was documented in both MDM as applicable and the Disposition within this note     Time User Action Codes Description Comment    2/14/2019 10:32 PM Krista Davisbilly Kvngarleen Vitaliy U  8  [G96 [id number],  M79 [id number]] Bilateral leg pain       ED Disposition     ED Disposition Condition Date/Time Comment    Discharge Stable Thu Feb 14, 2019 10:31 PM Jaime Milks discharge to home/self care  Follow-up Information     Follow up With Specialties Details Why Contact Info    Sherine Hooper MD  Schedule an appointment as soon as possible for a visit  As needed [address] [address] [phone]            There are no discharge medications for this patient  No discharge procedures on file      ED Provider  Electronically Signed by           Sandy Osuna MD  02/14/19 2298

## 2019-02-19 ENCOUNTER — APPOINTMENT (EMERGENCY)
Dept: RADIOLOGY | Facility: HOSPITAL | Age: 54
End: 2019-02-19
Payer: COMMERCIAL

## 2019-02-19 ENCOUNTER — HOSPITAL ENCOUNTER (EMERGENCY)
Facility: HOSPITAL | Age: 54
Discharge: HOME | End: 2019-02-19
Attending: EMERGENCY MEDICINE | Admitting: EMERGENCY MEDICINE
Payer: COMMERCIAL

## 2019-02-19 VITALS
OXYGEN SATURATION: 98 % | RESPIRATION RATE: 18 BRPM | SYSTOLIC BLOOD PRESSURE: 149 MMHG | HEART RATE: 92 BPM | DIASTOLIC BLOOD PRESSURE: 92 MMHG | TEMPERATURE: 97.9 F

## 2019-02-19 DIAGNOSIS — M79.642 LEFT HAND PAIN: Primary | ICD-10-CM

## 2019-02-19 PROCEDURE — 73130 X-RAY EXAM OF HAND: CPT

## 2019-02-19 PROCEDURE — 99283 EMERGENCY DEPT VISIT LOW MDM: CPT

## 2019-02-19 RX ORDER — ACETAMINOPHEN 325 MG/1
650 TABLET ORAL ONCE
Status: COMPLETED | OUTPATIENT
Start: 2019-02-19 | End: 2019-02-19

## 2019-02-19 RX ADMIN — ACETAMINOPHEN 650 MG: 325 TABLET, FILM COATED ORAL at 04:29
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
## 2019-02-20 ENCOUNTER — HOSPITAL ENCOUNTER (EMERGENCY)
Facility: HOSPITAL | Age: 54
Discharge: HOME | End: 2019-02-20
Attending: EMERGENCY MEDICINE
Payer: COMMERCIAL

## 2019-02-20 VITALS
HEART RATE: 96 BPM | TEMPERATURE: 98.4 F | WEIGHT: 279.98 LBS | DIASTOLIC BLOOD PRESSURE: 74 MMHG | SYSTOLIC BLOOD PRESSURE: 135 MMHG | RESPIRATION RATE: 16 BRPM | OXYGEN SATURATION: 96 % | BODY MASS INDEX: 43.85 KG/M2

## 2019-02-20 DIAGNOSIS — R11.0 NAUSEA: Primary | ICD-10-CM

## 2019-02-20 PROCEDURE — 99283 EMERGENCY DEPT VISIT LOW MDM: CPT

## 2019-02-20 NOTE — ED NOTES
Call from [address] [place] [address] with arrival time in 10 minutes  Patient informed       Feliciano Schilder, RN  02/20/19 5407

## 2019-02-20 NOTE — DISCHARGE INSTRUCTIONS
DIAGNOSIS: NAUSEA    - DIET/ACTIVITY AS TOLERATED    - PLEASE RETURN TO  THE ER FOR ANY NEW / WORSENING/CONCERNING SYMPTOMS TO YOU
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
## 2019-02-20 NOTE — ED NOTES
Patient belongings: boots, 3 shirts, sweater, hat, pants, fleece lined shirt, multiple bus schedules, pens, batteries, glasses, matches, magic towel, loose change, nailclipper, pipe, glove, keys, nailpolish, lighters, hip bag containing cigarette buds, empty liquor bottle (small), black backpack containing  Papers, brush, deodorant, first aid kits, food, glass case with cigar pieces,bottle with juice,  black meshbag containing, cosmeticbag with make up, t shirt, small metal rods ("they are from an alien spaceship"), small empty liquor bottle,      Trudy Candle  02/20/19 1500

## 2019-02-20 NOTE — ED NOTES
Pt appears dirty and states that he lives in the [facility]  Pt insisted on having his watch at bedside because his mom gave it to him        Jefry Crouch RN  02/20/19 2006
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
## 2019-03-05 ENCOUNTER — HOSPITAL ENCOUNTER (OUTPATIENT)
Facility: HOSPITAL | Age: 54
Setting detail: OBSERVATION
Discharge: HOME | End: 2019-03-06
Attending: EMERGENCY MEDICINE | Admitting: INTERNAL MEDICINE
Payer: COMMERCIAL

## 2019-03-05 DIAGNOSIS — R00.0 TACHYCARDIA: ICD-10-CM

## 2019-03-05 DIAGNOSIS — N30.90 CYSTITIS: ICD-10-CM

## 2019-03-05 DIAGNOSIS — E87.2 LACTIC ACIDOSIS: ICD-10-CM

## 2019-03-05 DIAGNOSIS — I71.4 AAA (ABDOMINAL AORTIC ANEURYSM) (HCC): ICD-10-CM

## 2019-03-05 DIAGNOSIS — R07.9 CHEST PAIN: ICD-10-CM

## 2019-03-05 DIAGNOSIS — A41.9 SEPSIS (HCC): Primary | ICD-10-CM

## 2019-03-05 DIAGNOSIS — R73.9 HYPERGLYCEMIA: ICD-10-CM

## 2019-03-05 LAB
BASOPHILS # BLD AUTO: 0.02 THOUSANDS/ΜL (ref 0–0.1)
BASOPHILS NFR BLD AUTO: 0 % (ref 0–1)
EOSINOPHIL # BLD AUTO: 0.14 THOUSAND/ΜL (ref 0–0.61)
EOSINOPHIL NFR BLD AUTO: 2 % (ref 0–6)
ERYTHROCYTE [DISTWIDTH] IN BLOOD BY AUTOMATED COUNT: 14 % (ref 11.6–15.1)
HCT VFR BLD AUTO: 39.8 % (ref 36.5–49.3)
HGB BLD-MCNC: 12.8 G/DL (ref 12–17)
IMM GRANULOCYTES # BLD AUTO: 0.03 THOUSAND/UL (ref 0–0.2)
IMM GRANULOCYTES NFR BLD AUTO: 0 % (ref 0–2)
LYMPHOCYTES # BLD AUTO: 2.32 THOUSANDS/ΜL (ref 0.6–4.47)
LYMPHOCYTES NFR BLD AUTO: 26 % (ref 14–44)
MCH RBC QN AUTO: 27.6 PG (ref 26.8–34.3)
MCHC RBC AUTO-ENTMCNC: 32.2 G/DL (ref 31.4–37.4)
MCV RBC AUTO: 86 FL (ref 82–98)
MONOCYTES # BLD AUTO: 0.82 THOUSAND/ΜL (ref 0.17–1.22)
MONOCYTES NFR BLD AUTO: 9 % (ref 4–12)
NEUTROPHILS # BLD AUTO: 5.57 THOUSANDS/ΜL (ref 1.85–7.62)
NEUTS SEG NFR BLD AUTO: 63 % (ref 43–75)
NRBC BLD AUTO-RTO: 0 /100 WBCS
PLATELET # BLD AUTO: 169 THOUSANDS/UL (ref 149–390)
PMV BLD AUTO: 12.3 FL (ref 8.9–12.7)
RBC # BLD AUTO: 4.63 MILLION/UL (ref 3.88–5.62)
WBC # BLD AUTO: 8.9 THOUSAND/UL (ref 4.31–10.16)

## 2019-03-05 PROCEDURE — 85025 COMPLETE CBC W/AUTO DIFF WBC: CPT | Performed by: EMERGENCY MEDICINE

## 2019-03-05 PROCEDURE — 36415 COLL VENOUS BLD VENIPUNCTURE: CPT

## 2019-03-05 PROCEDURE — 83690 ASSAY OF LIPASE: CPT | Performed by: EMERGENCY MEDICINE

## 2019-03-05 PROCEDURE — 99285 EMERGENCY DEPT VISIT HI MDM: CPT

## 2019-03-05 PROCEDURE — 80053 COMPREHEN METABOLIC PANEL: CPT | Performed by: EMERGENCY MEDICINE

## 2019-03-05 RX ORDER — QUETIAPINE FUMARATE 100 MG/1
100 TABLET, FILM COATED ORAL 2 TIMES DAILY
COMMUNITY

## 2019-03-05 RX ORDER — NICOTINE 21 MG/24HR
1 PATCH, TRANSDERMAL 24 HOURS TRANSDERMAL EVERY 24 HOURS
COMMUNITY

## 2019-03-05 RX ORDER — METHOCARBAMOL 500 MG/1
500 TABLET, FILM COATED ORAL 3 TIMES DAILY
COMMUNITY

## 2019-03-05 RX ORDER — LISINOPRIL 2.5 MG/1
2.5 TABLET ORAL DAILY
COMMUNITY

## 2019-03-05 RX ORDER — ONDANSETRON 2 MG/ML
4 INJECTION INTRAMUSCULAR; INTRAVENOUS ONCE
Status: COMPLETED | OUTPATIENT
Start: 2019-03-06 | End: 2019-03-06

## 2019-03-05 RX ORDER — DIVALPROEX SODIUM 500 MG/1
250 TABLET, DELAYED RELEASE ORAL
COMMUNITY

## 2019-03-05 RX ORDER — POTASSIUM CHLORIDE 1.5 G/1.77G
20 POWDER, FOR SOLUTION ORAL 2 TIMES DAILY
COMMUNITY

## 2019-03-05 RX ORDER — FUROSEMIDE 20 MG/1
20 TABLET ORAL 2 TIMES DAILY
COMMUNITY

## 2019-03-06 ENCOUNTER — APPOINTMENT (EMERGENCY)
Dept: RADIOLOGY | Facility: HOSPITAL | Age: 54
End: 2019-03-06
Payer: COMMERCIAL

## 2019-03-06 ENCOUNTER — HOSPITAL ENCOUNTER (EMERGENCY)
Facility: HOSPITAL | Age: 54
Discharge: HOME | End: 2019-03-07
Attending: EMERGENCY MEDICINE | Admitting: EMERGENCY MEDICINE
Payer: COMMERCIAL

## 2019-03-06 VITALS
SYSTOLIC BLOOD PRESSURE: 141 MMHG | WEIGHT: 264.55 LBS | HEIGHT: 68 IN | RESPIRATION RATE: 18 BRPM | BODY MASS INDEX: 40.09 KG/M2 | HEART RATE: 95 BPM | TEMPERATURE: 98.5 F | OXYGEN SATURATION: 98 % | DIASTOLIC BLOOD PRESSURE: 87 MMHG

## 2019-03-06 DIAGNOSIS — Z71.89 COMPLEX CARE COORDINATION: Primary | ICD-10-CM

## 2019-03-06 DIAGNOSIS — R42 DIZZINESS: Primary | ICD-10-CM

## 2019-03-06 DIAGNOSIS — Z59.00 HOMELESSNESS: ICD-10-CM

## 2019-03-06 PROBLEM — R30.0 DYSURIA: Status: ACTIVE | Noted: 2019-03-06

## 2019-03-06 PROBLEM — F33.40 RECURRENT MAJOR DEPRESSIVE DISORDER, IN REMISSION (HCC): Status: ACTIVE | Noted: 2019-03-06

## 2019-03-06 PROBLEM — R79.89 ELEVATED LACTIC ACID LEVEL: Status: ACTIVE | Noted: 2019-03-06

## 2019-03-06 PROBLEM — N39.0 ACUTE LOWER UTI: Status: ACTIVE | Noted: 2019-03-06

## 2019-03-06 PROBLEM — R48.0 DYSLEXIA: Status: ACTIVE | Noted: 2019-03-06

## 2019-03-06 LAB
ALBUMIN SERPL BCP-MCNC: 3.6 G/DL (ref 3.5–5)
ALBUMIN SERPL BCP-MCNC: 3.6 G/DL (ref 3.5–5)
ALP SERPL-CCNC: 79 U/L (ref 46–116)
ALP SERPL-CCNC: 81 U/L (ref 46–116)
ALT SERPL W P-5'-P-CCNC: 21 U/L (ref 12–78)
ALT SERPL W P-5'-P-CCNC: 21 U/L (ref 12–78)
ANION GAP SERPL CALCULATED.3IONS-SCNC: 10 MMOL/L (ref 4–13)
ANION GAP SERPL CALCULATED.3IONS-SCNC: 8 MMOL/L (ref 4–13)
ANION GAP SERPL CALCULATED.3IONS-SCNC: 8 MMOL/L (ref 4–13)
AST SERPL W P-5'-P-CCNC: 12 U/L (ref 5–45)
AST SERPL W P-5'-P-CCNC: 9 U/L (ref 5–45)
ATRIAL RATE: 108 BPM
BACTERIA UR QL AUTO: ABNORMAL /HPF
BASOPHILS # BLD AUTO: 0.02 THOUSANDS/ΜL (ref 0–0.1)
BASOPHILS # BLD AUTO: 0.04 THOUSANDS/ΜL (ref 0–0.1)
BASOPHILS NFR BLD AUTO: 0 % (ref 0–1)
BASOPHILS NFR BLD AUTO: 1 % (ref 0–1)
BILIRUB SERPL-MCNC: 0.3 MG/DL (ref 0.2–1)
BILIRUB SERPL-MCNC: 0.3 MG/DL (ref 0.2–1)
BILIRUB UR QL STRIP: NEGATIVE
BUN SERPL-MCNC: 14 MG/DL (ref 5–25)
BUN SERPL-MCNC: 14 MG/DL (ref 5–25)
BUN SERPL-MCNC: 17 MG/DL (ref 5–25)
CALCIUM SERPL-MCNC: 8.3 MG/DL (ref 8.3–10.1)
CALCIUM SERPL-MCNC: 9 MG/DL (ref 8.3–10.1)
CALCIUM SERPL-MCNC: 9.1 MG/DL (ref 8.3–10.1)
CHLORIDE SERPL-SCNC: 102 MMOL/L (ref 100–108)
CHLORIDE SERPL-SCNC: 103 MMOL/L (ref 100–108)
CHLORIDE SERPL-SCNC: 106 MMOL/L (ref 100–108)
CLARITY UR: CLEAR
CO2 SERPL-SCNC: 25 MMOL/L (ref 21–32)
CO2 SERPL-SCNC: 26 MMOL/L (ref 21–32)
CO2 SERPL-SCNC: 27 MMOL/L (ref 21–32)
COLOR UR: YELLOW
CREAT SERPL-MCNC: 0.79 MG/DL (ref 0.6–1.3)
CREAT SERPL-MCNC: 0.83 MG/DL (ref 0.6–1.3)
CREAT SERPL-MCNC: 0.91 MG/DL (ref 0.6–1.3)
EOSINOPHIL # BLD AUTO: 0.11 THOUSAND/ΜL (ref 0–0.61)
EOSINOPHIL # BLD AUTO: 0.14 THOUSAND/ΜL (ref 0–0.61)
EOSINOPHIL NFR BLD AUTO: 2 % (ref 0–6)
EOSINOPHIL NFR BLD AUTO: 2 % (ref 0–6)
ERYTHROCYTE [DISTWIDTH] IN BLOOD BY AUTOMATED COUNT: 14 % (ref 11.6–15.1)
ERYTHROCYTE [DISTWIDTH] IN BLOOD BY AUTOMATED COUNT: 14.1 % (ref 11.6–15.1)
GFR SERPL CREATININE-BSD FRML MDRD: 100 ML/MIN/1.73SQ M
GFR SERPL CREATININE-BSD FRML MDRD: 94 ML/MIN/1.73SQ M
GFR SERPL CREATININE-BSD FRML MDRD: 98 ML/MIN/1.73SQ M
GLUCOSE P FAST SERPL-MCNC: 132 MG/DL (ref 65–99)
GLUCOSE SERPL-MCNC: 131 MG/DL (ref 65–140)
GLUCOSE SERPL-MCNC: 132 MG/DL (ref 65–140)
GLUCOSE SERPL-MCNC: 142 MG/DL (ref 65–140)
GLUCOSE SERPL-MCNC: 148 MG/DL (ref 65–140)
GLUCOSE UR STRIP-MCNC: NEGATIVE MG/DL
HCT VFR BLD AUTO: 36.1 % (ref 36.5–49.3)
HCT VFR BLD AUTO: 39.7 % (ref 36.5–49.3)
HGB BLD-MCNC: 11.5 G/DL (ref 12–17)
HGB BLD-MCNC: 12.6 G/DL (ref 12–17)
HGB UR QL STRIP.AUTO: NEGATIVE
HOLD SPECIMEN: NORMAL
HYALINE CASTS #/AREA URNS LPF: ABNORMAL /LPF
IMM GRANULOCYTES # BLD AUTO: 0.01 THOUSAND/UL (ref 0–0.2)
IMM GRANULOCYTES # BLD AUTO: 0.02 THOUSAND/UL (ref 0–0.2)
IMM GRANULOCYTES NFR BLD AUTO: 0 % (ref 0–2)
IMM GRANULOCYTES NFR BLD AUTO: 0 % (ref 0–2)
KETONES UR STRIP-MCNC: NEGATIVE MG/DL
LACTATE SERPL-SCNC: 2 MMOL/L (ref 0.5–2)
LACTATE SERPL-SCNC: 2.5 MMOL/L (ref 0.5–2)
LACTATE SERPL-SCNC: 2.8 MMOL/L (ref 0.5–2)
LEUKOCYTE ESTERASE UR QL STRIP: ABNORMAL
LIPASE SERPL-CCNC: 212 U/L (ref 73–393)
LYMPHOCYTES # BLD AUTO: 1.98 THOUSANDS/ΜL (ref 0.6–4.47)
LYMPHOCYTES # BLD AUTO: 2.12 THOUSANDS/ΜL (ref 0.6–4.47)
LYMPHOCYTES NFR BLD AUTO: 30 % (ref 14–44)
LYMPHOCYTES NFR BLD AUTO: 32 % (ref 14–44)
MAGNESIUM SERPL-MCNC: 1.9 MG/DL (ref 1.6–2.6)
MCH RBC QN AUTO: 27.5 PG (ref 26.8–34.3)
MCH RBC QN AUTO: 27.6 PG (ref 26.8–34.3)
MCHC RBC AUTO-ENTMCNC: 31.7 G/DL (ref 31.4–37.4)
MCHC RBC AUTO-ENTMCNC: 31.9 G/DL (ref 31.4–37.4)
MCV RBC AUTO: 87 FL (ref 82–98)
MCV RBC AUTO: 87 FL (ref 82–98)
MONOCYTES # BLD AUTO: 0.75 THOUSAND/ΜL (ref 0.17–1.22)
MONOCYTES # BLD AUTO: 0.79 THOUSAND/ΜL (ref 0.17–1.22)
MONOCYTES NFR BLD AUTO: 12 % (ref 4–12)
MONOCYTES NFR BLD AUTO: 12 % (ref 4–12)
NEUTROPHILS # BLD AUTO: 3.64 THOUSANDS/ΜL (ref 1.85–7.62)
NEUTROPHILS # BLD AUTO: 3.67 THOUSANDS/ΜL (ref 1.85–7.62)
NEUTS SEG NFR BLD AUTO: 53 % (ref 43–75)
NEUTS SEG NFR BLD AUTO: 56 % (ref 43–75)
NITRITE UR QL STRIP: NEGATIVE
NON-SQ EPI CELLS URNS QL MICRO: ABNORMAL /HPF
NRBC BLD AUTO-RTO: 0 /100 WBCS
NRBC BLD AUTO-RTO: 0 /100 WBCS
P AXIS: 56 DEGREES
PH UR STRIP.AUTO: 7 [PH] (ref 4.5–8)
PHOSPHATE SERPL-MCNC: 4.2 MG/DL (ref 2.7–4.5)
PLATELET # BLD AUTO: 144 THOUSANDS/UL (ref 149–390)
PLATELET # BLD AUTO: 151 THOUSANDS/UL (ref 149–390)
PMV BLD AUTO: 12.1 FL (ref 8.9–12.7)
PMV BLD AUTO: 12.8 FL (ref 8.9–12.7)
POTASSIUM SERPL-SCNC: 3.7 MMOL/L (ref 3.5–5.3)
POTASSIUM SERPL-SCNC: 3.8 MMOL/L (ref 3.5–5.3)
POTASSIUM SERPL-SCNC: 3.9 MMOL/L (ref 3.5–5.3)
PR INTERVAL: 148 MS
PROCALCITONIN SERPL-MCNC: <0.05 NG/ML
PROT SERPL-MCNC: 6.9 G/DL (ref 6.4–8.2)
PROT SERPL-MCNC: 7.2 G/DL (ref 6.4–8.2)
PROT UR STRIP-MCNC: NEGATIVE MG/DL
QRS AXIS: 66 DEGREES
QRSD INTERVAL: 94 MS
QT INTERVAL: 346 MS
QTC INTERVAL: 463 MS
RBC # BLD AUTO: 4.17 MILLION/UL (ref 3.88–5.62)
RBC # BLD AUTO: 4.59 MILLION/UL (ref 3.88–5.62)
RBC #/AREA URNS AUTO: ABNORMAL /HPF
SODIUM SERPL-SCNC: 138 MMOL/L (ref 136–145)
SODIUM SERPL-SCNC: 138 MMOL/L (ref 136–145)
SODIUM SERPL-SCNC: 139 MMOL/L (ref 136–145)
SP GR UR STRIP.AUTO: 1.01 (ref 1–1.03)
T WAVE AXIS: 46 DEGREES
TROPONIN I SERPL-MCNC: <0.02 NG/ML
TROPONIN I SERPL-MCNC: <0.02 NG/ML
UROBILINOGEN UR QL STRIP.AUTO: 0.2 E.U./DL
VENTRICULAR RATE: 108 BPM
WBC # BLD AUTO: 6.55 THOUSAND/UL (ref 4.31–10.16)
WBC # BLD AUTO: 6.74 THOUSAND/UL (ref 4.31–10.16)
WBC #/AREA URNS AUTO: ABNORMAL /HPF

## 2019-03-06 PROCEDURE — 84145 PROCALCITONIN (PCT): CPT | Performed by: EMERGENCY MEDICINE

## 2019-03-06 PROCEDURE — 99236 HOSP IP/OBS SAME DATE HI 85: CPT | Performed by: INTERNAL MEDICINE

## 2019-03-06 PROCEDURE — 82948 REAGENT STRIP/BLOOD GLUCOSE: CPT

## 2019-03-06 PROCEDURE — 80053 COMPREHEN METABOLIC PANEL: CPT | Performed by: EMERGENCY MEDICINE

## 2019-03-06 PROCEDURE — 80048 BASIC METABOLIC PNL TOTAL CA: CPT | Performed by: INTERNAL MEDICINE

## 2019-03-06 PROCEDURE — 81003 URINALYSIS AUTO W/O SCOPE: CPT

## 2019-03-06 PROCEDURE — 85025 COMPLETE CBC W/AUTO DIFF WBC: CPT | Performed by: EMERGENCY MEDICINE

## 2019-03-06 PROCEDURE — 83605 ASSAY OF LACTIC ACID: CPT | Performed by: EMERGENCY MEDICINE

## 2019-03-06 PROCEDURE — 71260 CT THORAX DX C+: CPT

## 2019-03-06 PROCEDURE — 96374 THER/PROPH/DIAG INJ IV PUSH: CPT

## 2019-03-06 PROCEDURE — 87086 URINE CULTURE/COLONY COUNT: CPT | Performed by: PHYSICIAN ASSISTANT

## 2019-03-06 PROCEDURE — 36415 COLL VENOUS BLD VENIPUNCTURE: CPT | Performed by: EMERGENCY MEDICINE

## 2019-03-06 PROCEDURE — 85025 COMPLETE CBC W/AUTO DIFF WBC: CPT | Performed by: INTERNAL MEDICINE

## 2019-03-06 PROCEDURE — 81001 URINALYSIS AUTO W/SCOPE: CPT

## 2019-03-06 PROCEDURE — 93010 ELECTROCARDIOGRAM REPORT: CPT | Performed by: INTERNAL MEDICINE

## 2019-03-06 PROCEDURE — 93005 ELECTROCARDIOGRAM TRACING: CPT

## 2019-03-06 PROCEDURE — 71045 X-RAY EXAM CHEST 1 VIEW: CPT

## 2019-03-06 PROCEDURE — 84484 ASSAY OF TROPONIN QUANT: CPT | Performed by: EMERGENCY MEDICINE

## 2019-03-06 PROCEDURE — 74177 CT ABD & PELVIS W/CONTRAST: CPT

## 2019-03-06 PROCEDURE — 87040 BLOOD CULTURE FOR BACTERIA: CPT | Performed by: EMERGENCY MEDICINE

## 2019-03-06 PROCEDURE — 84100 ASSAY OF PHOSPHORUS: CPT | Performed by: INTERNAL MEDICINE

## 2019-03-06 PROCEDURE — 96361 HYDRATE IV INFUSION ADD-ON: CPT

## 2019-03-06 PROCEDURE — 87186 SC STD MICRODIL/AGAR DIL: CPT | Performed by: PHYSICIAN ASSISTANT

## 2019-03-06 PROCEDURE — 99284 EMERGENCY DEPT VISIT MOD MDM: CPT

## 2019-03-06 PROCEDURE — 87077 CULTURE AEROBIC IDENTIFY: CPT | Performed by: PHYSICIAN ASSISTANT

## 2019-03-06 PROCEDURE — 83605 ASSAY OF LACTIC ACID: CPT | Performed by: INTERNAL MEDICINE

## 2019-03-06 PROCEDURE — 83735 ASSAY OF MAGNESIUM: CPT | Performed by: INTERNAL MEDICINE

## 2019-03-06 RX ORDER — DOCUSATE SODIUM 100 MG/1
100 CAPSULE, LIQUID FILLED ORAL 2 TIMES DAILY PRN
Status: DISCONTINUED | OUTPATIENT
Start: 2019-03-06 | End: 2019-03-06 | Stop reason: HOSPADM

## 2019-03-06 RX ORDER — SODIUM CHLORIDE 9 MG/ML
125 INJECTION, SOLUTION INTRAVENOUS CONTINUOUS
Status: DISCONTINUED | OUTPATIENT
Start: 2019-03-06 | End: 2019-03-06 | Stop reason: HOSPADM

## 2019-03-06 RX ORDER — QUETIAPINE FUMARATE 100 MG/1
100 TABLET, FILM COATED ORAL 2 TIMES DAILY
Status: DISCONTINUED | OUTPATIENT
Start: 2019-03-06 | End: 2019-03-06 | Stop reason: HOSPADM

## 2019-03-06 RX ORDER — METHOCARBAMOL 500 MG/1
500 TABLET, FILM COATED ORAL 3 TIMES DAILY
Status: DISCONTINUED | OUTPATIENT
Start: 2019-03-06 | End: 2019-03-06 | Stop reason: HOSPADM

## 2019-03-06 RX ORDER — POTASSIUM CHLORIDE 20MEQ/15ML
20 LIQUID (ML) ORAL DAILY
Status: DISCONTINUED | OUTPATIENT
Start: 2019-03-06 | End: 2019-03-06 | Stop reason: HOSPADM

## 2019-03-06 RX ORDER — MAGNESIUM HYDROXIDE/ALUMINUM HYDROXICE/SIMETHICONE 120; 1200; 1200 MG/30ML; MG/30ML; MG/30ML
15 SUSPENSION ORAL EVERY 6 HOURS PRN
Status: DISCONTINUED | OUTPATIENT
Start: 2019-03-06 | End: 2019-03-06 | Stop reason: HOSPADM

## 2019-03-06 RX ORDER — ONDANSETRON 2 MG/ML
4 INJECTION INTRAMUSCULAR; INTRAVENOUS EVERY 6 HOURS PRN
Status: DISCONTINUED | OUTPATIENT
Start: 2019-03-06 | End: 2019-03-06 | Stop reason: HOSPADM

## 2019-03-06 RX ORDER — LISINOPRIL 2.5 MG/1
2.5 TABLET ORAL DAILY
Status: DISCONTINUED | OUTPATIENT
Start: 2019-03-06 | End: 2019-03-06 | Stop reason: HOSPADM

## 2019-03-06 RX ORDER — DIVALPROEX SODIUM 250 MG/1
250 TABLET, DELAYED RELEASE ORAL
Status: DISCONTINUED | OUTPATIENT
Start: 2019-03-06 | End: 2019-03-06 | Stop reason: HOSPADM

## 2019-03-06 RX ORDER — FUROSEMIDE 20 MG/1
20 TABLET ORAL 2 TIMES DAILY
Status: DISCONTINUED | OUTPATIENT
Start: 2019-03-06 | End: 2019-03-06 | Stop reason: HOSPADM

## 2019-03-06 RX ORDER — NICOTINE 21 MG/24HR
1 PATCH, TRANSDERMAL 24 HOURS TRANSDERMAL DAILY
Status: DISCONTINUED | OUTPATIENT
Start: 2019-03-06 | End: 2019-03-06 | Stop reason: HOSPADM

## 2019-03-06 RX ADMIN — QUETIAPINE FUMARATE 100 MG: 100 TABLET ORAL at 08:26

## 2019-03-06 RX ADMIN — CEFTRIAXONE SODIUM 1000 MG: 10 INJECTION, POWDER, FOR SOLUTION INTRAVENOUS at 01:56

## 2019-03-06 RX ADMIN — POTASSIUM CHLORIDE 20 MEQ: 20 SOLUTION ORAL at 08:26

## 2019-03-06 RX ADMIN — NICOTINE 1 PATCH: 14 PATCH TRANSDERMAL at 08:26

## 2019-03-06 RX ADMIN — SODIUM CHLORIDE 1000 ML: 0.9 INJECTION, SOLUTION INTRAVENOUS at 01:07

## 2019-03-06 RX ADMIN — LISINOPRIL 2.5 MG: 2.5 TABLET ORAL at 08:26

## 2019-03-06 RX ADMIN — SODIUM CHLORIDE 125 ML/HR: 0.9 INJECTION, SOLUTION INTRAVENOUS at 04:54

## 2019-03-06 RX ADMIN — SODIUM CHLORIDE 1000 ML: 0.9 INJECTION, SOLUTION INTRAVENOUS at 00:11

## 2019-03-06 RX ADMIN — ENOXAPARIN SODIUM 40 MG: 40 INJECTION SUBCUTANEOUS at 08:26

## 2019-03-06 RX ADMIN — METHOCARBAMOL 500 MG: 500 TABLET, FILM COATED ORAL at 08:26

## 2019-03-06 RX ADMIN — FUROSEMIDE 20 MG: 20 TABLET ORAL at 08:26

## 2019-03-06 RX ADMIN — ONDANSETRON 4 MG: 2 INJECTION INTRAMUSCULAR; INTRAVENOUS at 00:11

## 2019-03-06 RX ADMIN — IOHEXOL 100 ML: 350 INJECTION, SOLUTION INTRAVENOUS at 00:39

## 2019-03-06 SDOH — ECONOMIC STABILITY - HOUSING INSECURITY: HOMELESSNESS UNSPECIFIED: Z59.00

## 2019-03-06 NOTE — ASSESSMENT & PLAN NOTE
· Patient asymptomatic, he is a poor historian and history and not reliable  · Can discontinue antibiotics  Urinalysis only with occasional bacteria and a mild pyuria    · Urine culture add on pending

## 2019-03-06 NOTE — SEPSIS NOTE
Sepsis Note   Reuben Rodrigues 62 y o  male MRN: [id number]  Unit/Bed#: ED 06 Encounter: [id number]      Initial Sepsis Screening     [address] Name 03/06/19 0100                Is the patient's history suggestive of a new or worsening infection? Yes (Proceed)  (Abnormal)   -JE        Suspected source of infection  urinary tract infection  -JE        Are two or more of the following signs & symptoms of infection both present and new to the patient? Yes (Proceed)  (Abnormal)   -JE        Indicate SIRS criteria  Tachycardia > 90 bpm;Tachypnea > 20 resp per min  -Marene Dad        If the answer is yes to both questions, suspicion of sepsis is present          If severe sepsis is present AND tissue hypoperfusion perists in the hour after fluid resuscitation or lactate > 4, the patient meets criteria for SEPTIC SHOCK          Are any of the following organ dysfunction criteria present within 6 hours of suspected infection and SIRS criteria that are NOT considered to be chronic conditions?         Organ dysfunction          Date of presentation of severe sepsis          Time of presentation of severe sepsis          Tissue hypoperfusion persists in the hour after crystalloid fluid administration, evidenced, by either:          Was hypotension present within one hour of the conclusion of crystalloid fluid administration?           Date of presentation of septic shock          Time of presentation of septic shock            User Key  (r) = Recorded By, (t) = Taken By, (c) = Cosigned By    Initials Name Provider Type    [address] [place] [address] Physician
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
## 2019-03-06 NOTE — PLAN OF CARE
Problem: GENITOURINARY - ADULT  Goal: Maintains or returns to baseline urinary function  Description  INTERVENTIONS:  - Assess urinary function  - Encourage oral fluids to ensure adequate hydration  - Administer IV fluids as ordered to ensure adequate hydration  - Administer ordered medications as needed  - Offer frequent toileting  - Follow urinary retention protocol if ordered  Outcome: Progressing     Problem: METABOLIC, FLUID AND ELECTROLYTES - ADULT  Goal: Electrolytes maintained within normal limits  Description  INTERVENTIONS:  - Monitor labs and assess patient for signs and symptoms of electrolyte imbalances  - Administer electrolyte replacement as ordered  - Monitor response to electrolyte replacements, including repeat lab results as appropriate  - Instruct patient on fluid and nutrition as appropriate  Outcome: Progressing  Goal: Fluid balance maintained  Description  INTERVENTIONS:  - Monitor labs and assess for signs and symptoms of volume excess or deficit  - Monitor I/O and WT  - Instruct patient on fluid and nutrition as appropriate  Outcome: Progressing  Goal: Glucose maintained within target range  Description  INTERVENTIONS:  - Monitor Blood Glucose as ordered  - Assess for signs and symptoms of hyperglycemia and hypoglycemia  - Administer ordered medications to maintain glucose within target range  - Assess nutritional intake and initiate nutrition service referral as needed  Outcome: Progressing

## 2019-03-06 NOTE — ED PROVIDER NOTES
History  Chief Complaint   Patient presents with    Abdominal Pain     patient reports 7 out of 10 abdominal and back pain starting around 1900  Patient denies N/V/D     49-year-old male with a past medical history of diabetes presents to the emergency department for a 1 hour history of 10/10 generalized abdominal pain, 7/10 generalized lower back pain, and 5/10 chest pain  Patient states that the symptoms are not associated with trauma or exertion  Patient states both  are constant and sharp pain  Patient states he has associated nausea with his abdominal pain  Pt also complains of generalized chest pain without radiation, no increase in pain with exertion  Patient denies fever, chills, vomiting, melena, hematochezia, hematuria, penile discharge, testicular pain, dyspnea, shortness of breath, hemoptysis, pleuritic chest pain            Prior to Admission Medications   Prescriptions Last Dose Informant Patient Reported? Taking?    QUEtiapine (SEROquel) 100 mg tablet   Yes Yes   Sig: Take 100 mg by mouth 2 (two) times a day   divalproex sodium (DEPAKOTE) 500 mg EC tablet   Yes Yes   Sig: Take 250 mg by mouth daily at bedtime   furosemide (LASIX) 20 mg tablet   Yes Yes   Sig: Take 20 mg by mouth 2 (two) times a day   lisinopril (ZESTRIL) 2 5 mg tablet   Yes Yes   Sig: Take 2 5 mg by mouth daily   metFORMIN (GLUCOPHAGE) 500 mg tablet   Yes Yes   Sig: Take 500 mg by mouth 2 (two) times a day with meals   methocarbamol (ROBAXIN) 500 mg tablet   Yes Yes   Sig: Take 500 mg by mouth 3 (three) times a day   nicotine (NICODERM CQ) 14 mg/24hr TD 24 hr patch   Yes Yes   Sig: Place 1 patch on the skin every 24 hours   potassium chloride (KLOR-CON) 20 mEq packet   Yes Yes   Sig: Take 20 mEq by mouth 2 (two) times a day      Facility-Administered Medications: None       Past Medical History:   Diagnosis Date    Diabetes mellitus ([facility] [address] )     Homelessness        Past Surgical History:   Procedure Laterality Date    CHOLECYSTECTOMY         History reviewed  No pertinent family history  I have reviewed and agree with the history as documented  Social History     Tobacco Use    Smoking status: Current Some Day Smoker     Packs/day: 0 50     Types: Cigarettes    Smokeless tobacco: Never Used   Substance Use Topics    Alcohol use: No    Drug use: Yes     Types: Marijuana        Review of Systems   Constitutional: Negative for chills, diaphoresis, fatigue and fever  HENT: Negative for congestion, ear discharge, facial swelling, hearing loss, rhinorrhea, sinus pressure, sinus pain, sneezing, sore throat, tinnitus and trouble swallowing  Eyes: Negative for pain, discharge and redness  Respiratory: Negative for cough, choking, chest tightness, shortness of breath, wheezing and stridor  Cardiovascular: Positive for chest pain  Negative for palpitations and leg swelling  Gastrointestinal: Positive for abdominal pain  Negative for abdominal distention, blood in stool, constipation, diarrhea, nausea and vomiting  Endocrine: Negative for cold intolerance, polydipsia and polyuria  Genitourinary: Negative for difficulty urinating, dysuria, enuresis, flank pain, frequency and hematuria  Musculoskeletal: Positive for back pain  Negative for arthralgias, gait problem and neck stiffness  Skin: Negative for rash and wound  Neurological: Negative for dizziness, seizures, syncope, weakness, numbness and headaches  Hematological: Negative for adenopathy  Psychiatric/Behavioral: Negative for agitation, confusion, hallucinations, sleep disturbance and suicidal ideas  All other systems reviewed and are negative        Physical Exam  ED Triage Vitals [03/05/19 2320]   Temperature Pulse Respirations Blood Pressure SpO2   98 3 °F (36 8 °C) (!) 117 18 139/79 98 %      Temp Source Heart Rate Source Patient Position - Orthostatic VS BP Location FiO2 (%)   Oral Monitor Sitting Right arm --      Pain Score       7 Orthostatic Vital Signs  Vitals:    03/05/19 2320 03/05/19 2330 03/06/19 0015 03/06/19 0045   BP: 139/79 132/74 117/74 132/71   Pulse: (!) 117 (!) 117 (!) 109 (!) 111   Patient Position - Orthostatic VS: Sitting Sitting Sitting Sitting       Physical Exam   Constitutional: He is oriented to person, place, and time  He appears well-developed and well-nourished  No distress  HENT:   Head: Normocephalic and atraumatic  Eyes: EOM are normal    Neck: Normal range of motion  Cardiovascular: Normal rate and regular rhythm  Exam reveals no gallop and no friction rub  No murmur heard  Pulmonary/Chest: Breath sounds normal  No respiratory distress  He has no wheezes  He has no rales  Abdominal: Soft  Normal appearance and bowel sounds are normal  There is generalized tenderness  There is no rigidity, no rebound, no guarding, no CVA tenderness, no tenderness at McBurney's point and negative Castañeda's sign  Musculoskeletal:        Arms:  Neurological: He is alert and oriented to person, place, and time  No cranial nerve deficit or sensory deficit  GCS eye subscore is 4  GCS verbal subscore is 5  GCS motor subscore is 6  Reflex Scores:       Bicep reflexes are 2+ on the right side and 2+ on the left side  Patellar reflexes are 2+ on the right side and 2+ on the left side  Patient has equal 5/5 strength b/l UE and Le  No focal neuro deficits noted  Skin: Skin is warm and dry  Capillary refill takes less than 2 seconds  Psychiatric: He has a normal mood and affect  His behavior is normal  Judgment and thought content normal    Nursing note and vitals reviewed        ED Medications  Medications   sodium chloride 0 9 % bolus 1,000 mL (1,000 mL Intravenous New Bag 3/6/19 0107)   ceftriaxone (ROCEPHIN) 1 g/50 mL in dextrose IVPB (has no administration in time range)   sodium chloride 0 9 % bolus 1,000 mL (1,000 mL Intravenous New Bag 3/6/19 0011)   ondansetron (ZOFRAN) injection 4 mg (4 mg Intravenous Given 3/6/19 0011)   iohexol (OMNIPAQUE) 350 MG/ML injection (MULTI-DOSE) 100 mL (100 mL Intravenous Given 3/6/19 0039)       Diagnostic Studies  Results Reviewed     Procedure Component Value Units Date/Time    Urine Microscopic [[id number]]  (Abnormal) Collected:  03/06/19 0105    Lab Status:  Final result Specimen:  Urine, Clean Catch Updated:  03/06/19 0130     RBC, UA None Seen /hpf      WBC, UA 4-10 /hpf      Epithelial Cells None Seen /hpf      Bacteria, UA Occasional /hpf      Hyaline Casts, UA None Seen /lpf     Lactic acid, plasma [[id number]]  (Abnormal) Collected:  03/06/19 0010    Lab Status:  Final result Specimen:  Blood from Arm, Left Updated:  03/06/19 0124     LACTIC ACID 2 5 mmol/L     Narrative:       Result may be elevated if tourniquet was used during collection  Procalcitonin [[id number]]  (Normal) Collected:  03/06/19 0010    Lab Status:  Final result Specimen:  Blood from Arm, Left Updated:  03/06/19 0104     Procalcitonin <0 05 ng/ml     POCT urinalysis dipstick [[id number]]  (Normal) Resulted:  03/06/19 0102    Lab Status:  Final result Specimen:  Urine Updated:  03/06/19 0102    ED Urine Macroscopic [[id number]]  (Abnormal) Collected:  03/06/19 0105    Lab Status:  Final result Specimen:  Urine Updated:  03/06/19 0102     Color, UA Yellow     Clarity, UA Clear     pH, UA 7 0     Leukocytes, UA Trace     Nitrite, UA Negative     Protein, UA Negative mg/dl      Glucose, UA Negative mg/dl      Ketones, UA Negative mg/dl      Urobilinogen, UA 0 2 E U /dl      Bilirubin, UA Negative     Blood, UA Negative     Specific Gravity, UA 1 015    Narrative:       CLINITEK RESULT    Troponin I [[id number]]  (Normal) Collected:  03/06/19 0010    Lab Status:  Final result Specimen:  Blood from Arm, Left Updated:  03/06/19 0049     Troponin I <0 02 ng/mL     Blood culture #1 [[id number]] Collected:  03/06/19 0010    Lab Status:   In process Specimen:  Blood from Arm, Left Updated:  03/06/19 0021 Blood culture #2 [[id number]] Collected:  03/06/19 0015    Lab Status: In process Specimen:  Blood from Arm, Left Updated:  03/06/19 0021    Comprehensive metabolic panel [[id number]]  (Abnormal) Collected:  03/05/19 2327    Lab Status:  Final result Specimen:  Blood from Arm, Left Updated:  03/06/19 0013     Sodium 138 mmol/L      Potassium 3 8 mmol/L      Chloride 102 mmol/L      CO2 26 mmol/L      ANION GAP 10 mmol/L      BUN 17 mg/dL      Creatinine 0 91 mg/dL      Glucose 148 mg/dL      Calcium 9 0 mg/dL      AST 9 U/L      ALT 21 U/L      Alkaline Phosphatase 81 U/L      Total Protein 7 2 g/dL      Albumin 3 6 g/dL      Total Bilirubin 0 30 mg/dL      eGFR 94 ml/min/1 73sq m     Narrative:       National Kid[employer] Disease Education Program recommendations are as follows:  GFR calculation is accurate only with a steady state creatinine  Chronic Kidney disease less than 60 ml/min/1 73 sq  meters  Kidney failure less than 15 ml/min/1 73 sq  meters      Lipase [[id number]]  (Normal) Collected:  03/05/19 2327    Lab Status:  Final result Specimen:  Blood from Arm, Left Updated:  03/06/19 0013     Lipase 212 u/L     CBC and differential [[id number]] Collected:  03/05/19 2327    Lab Status:  Final result Specimen:  Blood from Arm, Left Updated:  03/05/19 2348     WBC 8 90 Thousand/uL      RBC 4 63 Million/uL      Hemoglobin 12 8 g/dL      Hematocrit 39 8 %      MCV 86 fL      MCH 27 6 pg      MCHC 32 2 g/dL      RDW 14 0 %      MPV 12 3 fL      Platelets 139 Thousands/uL      nRBC 0 /100 WBCs      Neutrophils Relative 63 %      Immat GRANS % 0 %      Lymphocytes Relative 26 %      Monocytes Relative 9 %      Eosinophils Relative 2 %      Basophils Relative 0 %      Neutrophils Absolute 5 57 Thousands/µL      Immature Grans Absolute 0 03 Thousand/uL      Lymphocytes Absolute 2 32 Thousands/µL      Monocytes Absolute 0 82 Thousand/µL      Eosinophils Absolute 0 14 Thousand/µL      Basophils Absolute 0 02 Thousands/µL CT chest abdomen pelvis w contrast   ED Interpretation by Amber Oliver DO (03/06 0104)      Mild urinary bladder wall thickening which may represent cystitis      Small infrarenal abdominal aortic aneurysm  Workstation performed: [id number]         Final Result by Mariam Cantu DO (03/06 9144)      Mild urinary bladder wall thickening which may represent cystitis      Small infrarenal abdominal aortic aneurysm  Workstation performed: [id number]               Procedures  ECG 12 Lead Documentation  Date/Time: 3/6/2019 12:58 AM  Performed by: Amber Oliver DO  Authorized by: Amber Oliver DO     Indications / Diagnosis:  Tachy  ECG reviewed by me, the ED Provider: yes    Patient location:  ED  Previous ECG:     Previous ECG:  Compared to current    Comparison ECG info:  [id number]    Similarity:  No change  Interpretation:     Interpretation: non-specific    Rate:     ECG rate:  108    ECG rate assessment: tachycardic    Rhythm:     Rhythm: sinus rhythm    Ectopy:     Ectopy: none    QRS:     QRS axis:  Normal    QRS intervals:  Normal  Conduction:     Conduction: normal    ST segments:     ST segments:  Normal  T waves:     T waves: normal            Phone Consults  ED Phone Contact    ED Course         HEART Risk Score      Most Recent Value   History  1 Filed at: 03/06/2019 0107   ECG  0 Filed at: 03/06/2019 0107   Age  1 Filed at: 03/06/2019 0107   Risk Factors  2 Filed at: 03/06/2019 0107   Troponin  0 Filed at: 03/06/2019 0107   Heart Score Risk Calculator   History  1 Filed at: 03/06/2019 0107   ECG  0 Filed at: 03/06/2019 0107   Age  1 Filed at: 03/06/2019 0107   Risk Factors  2 Filed at: 03/06/2019 0107   Troponin  0 Filed at: 03/06/2019 0107   HEART Score  4 Filed at: 03/06/2019 0107   HEART Score  4 Filed at: 03/06/2019 0107                Initial Sepsis Screening     Row Name 03/06/19 0100                Is the patient's history suggestive of a new or worsening infection? Yes (Proceed)  (Abnormal)   -JE        Suspected source of infection  urinary tract infection  -JE        Are two or more of the following signs & symptoms of infection both present and new to the patient? Yes (Proceed)  (Abnormal)   -JE        Indicate SIRS criteria  Tachycardia > 90 bpm;Tachypnea > 20 resp per min  -Becca Overton        If the answer is yes to both questions, suspicion of sepsis is present          If severe sepsis is present AND tissue hypoperfusion perists in the hour after fluid resuscitation or lactate > 4, the patient meets criteria for SEPTIC SHOCK          Are any of the following organ dysfunction criteria present within 6 hours of suspected infection and SIRS criteria that are NOT considered to be chronic conditions?         Organ dysfunction          Date of presentation of severe sepsis          Time of presentation of severe sepsis          Tissue hypoperfusion persists in the hour after crystalloid fluid administration, evidenced, by either:          Was hypotension present within one hour of the conclusion of crystalloid fluid administration?         Date of presentation of septic shock          Time of presentation of septic shock            User Key  (r) = Recorded By, (t) = Taken By, (c) = Cosigned By    Initials Name Provider Type    [address] [place] [address],  Physician                  MDM  Number of Diagnoses or Management Options  AAA (abdominal aortic aneurysm) [facility]): new and requires workup  Chest pain: new and requires workup  Cystitis: new and requires workup  Hyperglycemia: new and requires workup  Lactic acidosis: new and requires workup  Sepsis [facility]): new and requires workup  Tachycardia: new and requires workup  Diagnosis management comments: Patient is a poor historian, will evaluate for CBC, BMP, lipase, lactic acid troponin, EKG, CT chest abdomen and pelvis, IV Zofran with fluid  Heart score 4    1  SEPSIS, UTI  -2/2 cystitis  -Rocephin  -Admit    2   Infrarenal AAA, 3 6cm  -PCP f/u   -Admit    3  Cystitis    4  Elevated glucose, 148    5  Tachycardia, 111  -2/2 #1    6  Chest pain  -Admit cards workup    7  Lactic acidosis, 2 5  -fluids, repeat      Disposition  Final diagnoses:   [facility]epsis ([facility] )   Cystitis   AAA (abdominal aortic aneurysm) ([facility]) - infrarenal   Hyperglycemia   Tachycardia   Chest pain   Lactic acidosis     Time reflects when diagnosis was documented in both MDM as applicable and the Disposition within this note     Time User Action Codes Description Comment    3/6/2019  1:39 AM Espland, Juliano Add [A41 9] Sepsis ([facility] )     3/6/2019  1:40 AM Espland, Juliano Add [N30 90] Cystitis     3/6/2019  1:40 AM EsplandRossi Angry Add [I71 4] AAA (abdominal aortic aneurysm) ([facility] )     3/6/2019  1:40 AM Espland, Juliano Modify [I71 4] AAA (abdominal aortic aneurysm) ([facility] ) infrarenal    3/6/2019  1:40 AM Espland, Juliano Add [R73 9] Hyperglycemia     3/6/2019  1:40 AM Espland, Juliano Add [R00 0] Tachycardia     3/6/2019  1:40 AM Espland, Juliano Add [R07 9] Chest pain     3/6/2019  1:55 AM Espland, Juliano Add [D72 829] Leukocytosis     3/6/2019  1:55 AM Espland, Juliano Remove [D72 829] Leukocytosis     3/6/2019  1:55 AM Rossi Navarro Angry Add [E87 2] Lactic acidosis       ED Disposition     ED Disposition Condition Date/Time Comment    Admit Stable Wed Mar 6, 2019  1:55 AM Case was discussed with SKYLAR and the patient's admission status was agreed to be Admission Status: observation status to the service of Dr Silva Acevedo   Follow-up Information    None         Patient's Medications   Discharge Prescriptions    No medications on file     No discharge procedures on file  ED Provider  Attending physically available and evaluated Chichi Villanueva I managed the patient along with the ED Attending      Electronically Signed by         Rosa Berrios DO  03/06/19 2946

## 2019-03-06 NOTE — ASSESSMENT & PLAN NOTE
Patient claims abdominal discomfort with note of dysuria especially after voiding; this could be part and parcel of the lower urinary tract infection; continue Rocephin  Fluids

## 2019-03-06 NOTE — H&P
H&P- Odalis Melvin 1965, 62 y o  male MRN: [id number]    Unit/Bed#: ED 06 Encounter: [id number]    Primary Care Provider: Indira Pandey MD   Date and time admitted to hospital: 3/5/2019 11:13 PM        * Acute lower UTI  Assessment & Plan  Rocephin to continue  Fluids  Will monitor metabolic profile and CBC  Elevated lactic acid level  Assessment & Plan  Elevated lactic acid level may be multifactorial; noted is that procalcitonin is less than 0 5 although initially treated as sepsis; it may be that the elevation of lactic acid can be due to the use of metformin  As patient had CT scan with dye; will put on hold metformin for now  For the cystitis; we can still continue Rocephin at this point  Dysuria  Assessment & Plan  Patient claims abdominal discomfort with note of dysuria especially after voiding; this could be part and parcel of the lower urinary tract infection; continue Rocephin  Fluids  Dyslexia  Assessment & Plan  Patient claims to have dyslexia  She he also has depression and some psychiatric concerns and was in [facility] quite recently for 3 weeks  Will continue Seroquel  Recurrent major depressive disorder, in remission [facility])  Assessment & Plan  Patient is currently on Seroquel and will continue  VTE Prophylaxis: Enoxaparin (Lovenox)  / sequential compression device   Code Status: No Order full Code as discussed with patient  POLST: There is no POLST form on file for this patient (pre-hospital)    Anticipated Length of Stay:  Patient will be admitted on an Observation basis with an anticipated length of stay of  less than 2 midnights  Justification for Hospital Stay: Please see detailed plans noted above  Chief Complaint:     Lower abdominal discomfort  History of Present Illness:  Odalis Melvin is a 62 y o  male who has a past medical history significant for depression and psychiatric concerns and was in [facility] for 3 weeks    Likewise he has hypertension, chronic back pain and currently on Robaxin, nicotine use, and diabetes mellitus on metformin  Patient has been doing quite well and until this evening when he felt abdominal discomfort hypogastric area with dysuria especially upon voiding  That said he was also complaining of back pain and therefore he went to the emergency room to be evaluated where he was seen to have mild pyuria and noted bladder thickening consistent with cystitis  He was started on Rocephin  Lactic acid was initially elevated at 2 5  However procalcitonin is less than 0 05  Review of Systems:    Constitutional:  Denies fever or chills   Eyes:  Denies change in visual acuity   HENT:  Denies nasal congestion or sore throat   Respiratory:  Denies cough or shortness of breath   Cardiovascular:  Denies chest pain or edema   GI:  Abdominal discomfort at hypogastric area with note of nausea no vomiting  No diarrhea or hematochezia/melena    :  Denies dysuria   Musculoskeletal:  Denies back pain or joint pain   Integument:  Denies rash   Neurologic:  Denies headache, focal weakness or sensory changes   Endocrine:  Denies polyuria or polydipsia   Lymphatic:  Denies swollen glands   Psychiatric:  Denies depression or anxiety     Past Medical and Surgical History:   Past Medical History:   Diagnosis Date    Diabetes mellitus ([facility] [address] )     Homelessness      Past Surgical History:   Procedure Laterality Date    CHOLECYSTECTOMY         Meds/Allergies:    divalproex sodium (DEPAKOTE) 500 mg EC tablet Take 250 mg by mouth daily at bedtime Ye Hinds MD Reordered   Ordered as: divalproex sodium (DEPAKOTE) EC tablet 250 mg - 250 mg, Oral, Daily at bedtime, First dose on Wed 3/6/19 at 0200 Swallow whole; do not crush, chew or split    furosemide (LASIX) 20 mg tablet Take 20 mg by mouth 2 (two) times a day Ye Hinds MD Reordered   Ordered as: furosemide (LASIX) tablet 20 mg - 20 mg, Oral, 2 times daily, First dose on Wed 3/6/19 at 0900 [facility] MED Hold for systolic blood pressure less than (mmHg): 110   lisinopril (ZESTRIL) 2 5 mg tablet Take 2 5 mg by mouth daily Lieutenant Checo MD Reordered   Ordered as: lisinopril (ZESTRIL) tablet 2 5 mg - 2 5 mg, Oral, Daily, First dose on Wed 3/6/19 at 0900 [facility] [employer] [facility] Hold for systolic blood pressure less than (mmHg): 110   metFORMIN (GLUCOPHAGE) 500 mg tablet Take 500 mg by mouth 2 (two) times a day with meals Lieutenant Checo MD Not Ordered   methocarbamol (ROBAXIN) 500 mg tablet Take 500 mg by mouth 3 (three) times a day Lieutenant Checo MD Reordered   Ordered as: methocarbamol (ROBAXIN) tablet 500 mg - 500 mg, Oral, 3 times daily, First dose on Wed 3/6/19 at 0900   nicotine (NICODERM CQ) 14 mg/24hr TD 24 hr patch Place 1 patch on the skin every 24 hours Lieutenant Checo MD Not Ordered   potassium chloride (KLOR-CON) 20 mEq packet Take 20 mEq by mouth 2 (two) times a day Lieutenant Checo MD Reordered   Ordered as: potassium chloride 10 % oral solution 20 mEq - 20 mEq, Oral, Daily, First dose on Wed 3/6/19 at 0900 **DISPOSE IN 8 GALLON BL[employer] CONTAINER**    QUEtiapine (SEROquel) 100 mg tablet Take 100 mg by mouth 2 (two) times a day Lieutenant Checo MD Reordered   Ordered as: QUEtiapine (SEROquel) tablet 100 mg - 100 mg, Oral, 2 times daily, First dose on Wed 3/6/19 at 0900 [facility] [employer] MED          Allergies: No Known Allergies  History:  Marital Status: Single   Occupation:  He claims to repair computers, mechanical parts etc   Patient Pre-hospital Living Situation:  Lives at home; used to live with a girlfriend who recently  from him  Patient Pre-hospital Level of Mobility:  Ambulatory  Patient Pre-hospital Diet Restrictions:  Regular diet, diabetic  Substance Use History:   Social History     Substance and Sexual Activity   Alcohol Use No     Social History     Tobacco Use   Smoking Status Current Some Day Smoker    Packs/day: 0 50    Types: Cigarettes   Smokeless Tobacco Never Used     Social History     Substance and Sexual Activity   Drug Use Yes    Types: Marijuana       Family History:  History reviewed  No pertinent family history  Physical Exam:     Vitals:   Blood Pressure: 132/71 (03/06/19 0045)  Pulse: (!) 111 (03/06/19 0045)  Temperature: 98 3 °F (36 8 °C) (03/05/19 2320)  Temp Source: Oral (03/05/19 2320)  Respirations: 20 (03/06/19 0045)  Height: 5' 7" (170 2 cm) (03/05/19 2320)  Weight - Scale: 127 kg (279 lb 15 8 oz) (03/05/19 2320)  SpO2: 97 % (03/06/19 0045)    Constitutional:  Well developed, well nourished, no acute distress, non-toxic appearance; transient attention with flight of ideas   Eyes:  PERRL, conjunctiva normal   HENT:  Atraumatic, external ears normal, nose normal, oropharynx moist, no pharyngeal exudates  Neck- normal range of motion, no tenderness, supple   Respiratory:  No respiratory distress, normal breath sounds, no rales, no wheezing   Cardiovascular:  Normal rate, normal rhythm, no murmurs, no gallops, no rubs   GI:  Soft abdomen with note of hypogastric discomfort when pressed  No guarding  No extreme tenderness  :  No costovertebral angle tenderness   Musculoskeletal:  No edema, no tenderness, no deformities  Diffused back pain more so at the thoracic area  Integument:  Well hydrated, no rash   Lymphatic:  No lymphadenopathy noted   Neurologic:  Alert &awake, communicative, CN 2-12 normal, normal motor function, normal sensory function, no focal deficits noted   Psychiatric:  Speech and behavior with flight of ideas and changing topic from 1 to the other; cannot keep conversation in 1 topic ; tangential thinking      Lab Results: I have personally reviewed pertinent reports        Results from last 7 days   Lab Units 03/05/19 2327   WBC Thousand/uL 8 90   HEMOGLOBIN g/dL 12 8   HEMATOCRIT % 39 8   PLATELETS Thousands/uL 169   NEUTROS PCT % 63   LYMPHS PCT % 26   MONOS PCT % 9   EOS PCT % 2 Results from last 7 days   Lab Units 03/05/19  2327   POTASSIUM mmol/L 3 8   CHLORIDE mmol/L 102   CO2 mmol/L 26   BUN mg/dL 17   CREATININE mg/dL 0 91   CALCIUM mg/dL 9 0   ALK PHOS U/L 81   ALT U/L 21   AST U/L 9             Imaging: I have personally reviewed pertinent reports  Xr Hand 3+ Views Left    Result Date: 2/19/2019  Narrative: LEFT HAND INDICATION:   hand pain  COMPARISON:  None VIEWS:  XR HAND 3+ VW LEFT For the purposes of institution wide universal language the following terms will apply: (thumb=1st digit/finger, index finger=2nd digit/finger, long finger=3rd digit/finger, ring=4th digit/finger and small finger=5th digit/finger) FINDINGS: There is no acute fracture or dislocation  No significant degenerative changes  No lytic or blastic lesions are seen  Soft tissues are unremarkable  Impression: No acute osseous abnormality  Workstation performed: [id number]     Xr Knee 4+ Vw Left Injury    Result Date: 2/9/2019  Narrative: LEFT KNEE INDICATION:   fall  COMPARISON:  December 14, 2018 VIEWS:  XR KNEE 4+ VW LEFT INJURY FINDINGS: There is no acute fracture or dislocation  There is no joint effusion  No significant degenerative changes  No lytic or blastic lesions are seen  Subcutaneous edema/swelling over the distal left leg  Impression: No acute osseous abnormality  Workstation performed: [id number]     Ct Chest Abdomen Pelvis W Contrast    Result Date: 3/6/2019  Narrative: CT CHEST, ABDOMEN AND PELVIS WITH IV CONTRAST INDICATION:   generalized abdominal pain and chest pain  COMPARISON:  None  TECHNIQUE: CT examination of the chest, abdomen and pelvis was performed  Axial, sagittal, and coronal 2D reformatted images were created from the source data and submitted for interpretation  Radiation dose length product (DLP) for this visit:  1623 mGy-cm     This examination, like all CT scans performed in the [facility], was performed utilizing techniques to minimize radiation dose exposure, including the use of iterative reconstruction and automated exposure control  IV Contrast:  100 mL of iohexol (OMNIPAQUE) Enteric Contrast: Enteric contrast was administered  FINDINGS: CHEST LUNGS:  Lungs are clear  There is no tracheal or endobronchial lesion  PLEURA:  Unremarkable  HEART/GREAT VESSELS:  Unremarkable for patient's age  MEDIASTINUM AND KALYAN:  Unremarkable  CHEST WALL AND LOWER NECK:   Unremarkable  ABDOMEN LIVER/BILIARY TREE:  One or more subcentimeter sharply circumscribed low-density hepatic lesion(s) are noted, too small to accurately characterize, but statistically most likely to represent subcentimeter hepatic cysts  No suspicious solid hepatic lesion is identified  Hepatic contours are normal   No biliary dilatation  GALLBLADDER:  Gallbladder is surgically absent  SPLEEN:  Unremarkable  PANCREAS:  Unremarkable  ADRENAL GLANDS: There is a 1 1 cm left adrenal nodule  Although its imaging features are indeterminate, it does not have suspicious imaging features (heterogeneity, necrosis, irregular margins), therefore this is likely benign, and can be followed by non-contrast abdomen CT or MRI in 12 months  If patient has history of adrenal hyperfunction, consider biochemical evaluation  Recommendation based on institutional consensus and [facility] of Radiology 7964;1:[phone] KIDNEYS/URETERS:  One or more sharply circumscribed subcentimeter renal hypodensities are noted  These lesions are too small to accurately characterize, but are statistically most likely to represent benign cortical renal cyst(s)  According to the guidelines published in the [facility] Paper of the ACR Incidental Findings Committee (Radiology 2010), no further workup of these lesions is recommended  STOMACH AND BOWEL:  There is colonic diverticulosis without evidence of acute diverticulitis  APPENDIX:  No findings to suggest appendicitis   ABDOMINOPELVIC CAVITY:  No ascites or free intraperitoneal air  No lymphadenopathy  VESSELS:  Atherosclerotic changes are present  Infrarenal abdominal aorta measures up to 3 6 cm PELVIS REPRODUCTIVE ORGANS:  The prostate is enlarged  URINARY BLADDER:  Mild urinary bladder wall thickening ABDOMINAL WALL/INGUINAL REGIONS:  Fat-containing right-sided inguinal hernia is noted  OSSEOUS STRUCTURES:  Degenerative changes in the spine are seen  Impression: Mild urinary bladder wall thickening which may represent cystitis Small infrarenal abdominal aortic aneurysm  Workstation performed: [id number]         ** Please Note: Dragon 360 Dictation voice to text software was used in the creation of this document   **

## 2019-03-06 NOTE — ASSESSMENT & PLAN NOTE
Elevated lactic acid level may be multifactorial; noted is that procalcitonin is less than 0 5 although initially treated as sepsis; it may be that the elevation of lactic acid can be due to the use of metformin  As patient had CT scan with dye; will put on hold metformin for now  For the cystitis; we can still continue Rocephin at this point

## 2019-03-06 NOTE — ED ATTENDING ATTESTATION
Salome Bee MD, saw and evaluated the patient  I have discussed the patient with the resident/non-physician practitioner and agree with the resident's/non-physician practitioner's findings, Plan of Care, and MDM as documented in the resident's/non-physician practitioner's note, except where noted  All available labs and Radiology studies were reviewed  I was present for key portions of any procedure(s) performed by the resident/non-physician practitioner and I was immediately available to provide assistance  At this point I agree with the current assessment done in the Emergency Department  I have conducted an independent evaluation of this patient a history and physical is as follows:    78-year-old male with history of diabetes presents with severe generalized abdominal pain and left-sided chest pain  Patient is a poor historian  He presents with tachycardia and tachypnea  Appears moderately uncomfortable  Heart tachycardic, regular with no murmurs rubs or gallops  Lungs clear to auscultation bilaterally  There is diffuse abdominal tenderness without rebound or guarding  Plan labs, imaging, EKG, admission        Critical Care Time  Procedures

## 2019-03-06 NOTE — DISCHARGE SUMMARY
Discharge- Brandi Flores 1965, 62 y o  male MRN: [id number]  Unit/Bed#: PPHP 704-01 Encounter: [id number] DOS: 3/6/19  Primary Care Provider: Eren Jones MD   Date and time admitted to hospital: 3/5/2019 11:13 PM    * Acute lower UTI  Assessment & Plan  · Patient asymptomatic, he is a poor historian and history is not reliable  · Can discontinue antibiotics  Urinalysis only with occasional bacteria and a mild pyuria  · Urine culture add on pending     Homelessness  Assessment & Plan  · Patient has been in [address]  [facility] 8 times in February, not including other outside facility encounters  Was discharged from [facility] yesterday to a homeless shelter where the story becomes inconsistent and unreliable  Patient reports people were digging through his belongings, told another staff member he had a syncopal event and another staff member that he had back pain and was brought to the emergency department  Told me that he had nausea  · Case management following, spoke with local shelter where patient will be discharged to  Assisting patient in finding an outpatient     · Patient states he could stay with his elderly aunt, whom he claims has dementia, and describes breaking into her house to stay there  · Patient has dyslexia and is reportedly illiterate, given daily bus pass as well     Recurrent major depressive disorder, in remission [facility])  Assessment & Plan  · Continue psychotropic medications, recently discharged from [address] [place] [address] Physician / Practitioner: Kim Will PA-C  PCP: Eren Jones MD  Admission Date:   Admission Orders (From admission, onward)    Ordered        03/06/19 0155  Place in Observation  Once     Order ID Start Status   [id number] 03/06/19 0156 Completed              Discharge Date: 03/06/19    Resolved Problems  Date Reviewed: 3/6/2019    None          Consultations During Hospital Stay:  · None    Procedures Performed: · None    Significant Findings / Test Results:   · Lactic acidosis, resolved  · Urinalysis with mild pyuria, occasional bacteria  · CT chest abdomen pelvis, mild urinary bladder wall thickening which may represent cystitis, small infrarenal abdominal aortic aneurysm, fat containing right-sided inguinal hernia    Incidental Findings:   · None    Test Results Pending at Discharge (will require follow up): · Blood cultures pending x2  · Urine culture pending     Outpatient Tests Requested:  · Follow-up with PCP    Complications:  None    Reason for Admission: back pain     Hospital Course:     Kirsty Shaffer is a 62 y o  male patient with past medical history significant for psychiatric illness, diabetes, hypertension who originally presented to the hospital on 3/5/2019 due to reported back pain  Patient was recently discharged from inpatient behavioral health unit to a homeless shelter 03/05/2019  Story is convoluted as patient is a poor historian and unreliable with his history  Patient told me he was nauseated and passed out while having a bowel movement  He also told the ER staff that he had low back pain and dysuria  Urinalysis was essentially benign however he was started on IV antibiotics and admitted under observation status  Patient denies symptoms today, antibiotics were discontinued  He had no leukocytosis and remained afebrile  The patient is homeless and reportedly was breaking into his aunt's house who he claims has dementia  Case management was very involved in his discharge planning  Please see above list of diagnoses and related plan for additional information  Condition at Discharge: stable     Discharge Day Visit / Exam:     Subjective:  Patient only concern right now is making sure he is getting lunch and dinner today  States he would go back to shelter today he can  Back pain and dysuria resolved  Tells me he actually came here because he was nauseated and passed out    She is refusing further IV fluids  Vitals: Blood Pressure: 141/87 (03/06/19 0753)  Pulse: 95 (03/06/19 0753)  Temperature: 98 5 °F (36 9 °C) (03/06/19 0753)  Temp Source: Oral (03/06/19 0753)  Respirations: 18 (03/06/19 0753)  Height: 5' 8" (172 7 cm) (03/06/19 0315)  Weight - Scale: 120 kg (264 lb 8 8 oz) (03/06/19 0546)  SpO2: 98 % (03/06/19 0753)    Exam:   Physical Exam   Constitutional: He is oriented to person, place, and time  He appears well-developed and well-nourished  Disheveled appearing   HENT:   Head: Normocephalic and atraumatic  Eyes: EOM are normal  No scleral icterus  Neck: Normal range of motion  Neck supple  Cardiovascular: Normal rate, regular rhythm and normal heart sounds  Pulmonary/Chest: Effort normal and breath sounds normal    Abdominal: Soft  Bowel sounds are normal    Morbidly obese   Musculoskeletal: Normal range of motion  He exhibits no edema  Neurological: He is alert and oriented to person, place, and time  Skin: Skin is warm and dry  Psychiatric:   On reliable, poor historian, irritable   Nursing note and vitals reviewed  Discussion with Family: n/a    Discharge instructions/Information to patient and family:   See after visit summary for information provided to patient and family  Provisions for Follow-Up Care:  See after visit summary for information related to follow-up care and any pertinent home health orders  Disposition:     Other: **    For Discharges to [facility] SNF:   · Not Applicable to this Patient - Not Applicable to this Patient    Planned Readmission: none     Discharge Statement:  I spent 45 minutes discharging the patient  This time was spent on the day of discharge  I had direct contact with the patient on the day of discharge   Greater than 50% of the total time was spent examining patient, answering all patient questions, arranging and discussing plan of care with patient as well as directly providing post-discharge instructions  Additional time then spent on discharge activities  Discharge Medications:  See after visit summary for reconciled discharge medications provided to patient and family        ** Please Note: This note has been constructed using a voice recognition system **

## 2019-03-06 NOTE — SOCIAL WORK
Per SLIM, pt ready for discharge today  Cm met with pt & explained Cm role  Pt stats he has been staying with his aunt in [place] in ranch home w 3ste, however she doesn't always let him in her house  Pt has been homeless & states he is aware of area local shelters  He was at [facility] in [facility]  Was was ipta, on ss & walks  No h/o vna or rhb  Inquired about any MH & pt states "I don't know"  Denies any h/o D&A  Has used [facility] in [place]  Emergency contact, friend Gregroia Main ( won't provide last name) [phone]  States she cannot transport pt today  Called [facility] & s/w adm assistant whom states they do not have a  & states pt can go there to the [facility] for breakfast, lunch & shower & state they are not an emergency shelter  [facility] shuttle can transport pt to [facility] on  [address]  Pt states he knows how to use the local bus transport if he has an all day pass  Provided pt with a pass to go to a shelter this evening  Provided pt with a list of warming shelters & emergency homeless shelters  Provided Rn with shuttle phone # to call when pt ready to leave today  Updated SLGURINDER  Per SLIM, pt has many ER visits  OP CM referral placed  Per pt, his cell phone number is [phone]   Provided correct number with referral

## 2019-03-06 NOTE — SEPSIS NOTE
Sepsis Note   Jesse Padron 62 y o  male MRN: [id number]  Unit/Bed#: ED 06 Encounter: [id number]      Initial Sepsis Screening     [address] Name 03/06/19 0100                Is the patient's history suggestive of a new or worsening infection? Yes (Proceed)  (Abnormal)   -JE        Suspected source of infection  urinary tract infection  -JE        Are two or more of the following signs & symptoms of infection both present and new to the patient? Yes (Proceed)  (Abnormal)   -JE        Indicate SIRS criteria  Tachycardia > 90 bpm;Tachypnea > 20 resp per min  -Olga Arvizu        If the answer is yes to both questions, suspicion of sepsis is present          If severe sepsis is present AND tissue hypoperfusion perists in the hour after fluid resuscitation or lactate > 4, the patient meets criteria for SEPTIC SHOCK          Are any of the following organ dysfunction criteria present within 6 hours of suspected infection and SIRS criteria that are NOT considered to be chronic conditions?         Organ dysfunction          Date of presentation of severe sepsis          Time of presentation of severe sepsis          Tissue hypoperfusion persists in the hour after crystalloid fluid administration, evidenced, by either:          Was hypotension present within one hour of the conclusion of crystalloid fluid administration?           Date of presentation of septic shock          Time of presentation of septic shock            User Key  (r) = Recorded By, (t) = Taken By, (c) = Cosigned By    Initials Name Provider Type    [address] [place] [address] Physician               Default Flowsheet Data (last 720 hours)      Sepsis Reassess     Row Name 03/06/19 0157                   Repeat Volume Status and Tissue Perfusion Assessment Performed    Repeat Volume Status and Tissue Perfusion Assessment Performed  Yes  -JE           Volume Status and Tissue Perfusion Post Fluid Resuscitation * Must Document All *    Vital Signs Reviewed (HR, RR, BP, T)  Yes  -JE        Shock Index Reviewed  Yes  -JE        Arterial Oxygen Saturation Reviewed (POx, SaO2 or SpO2)  Yes (comment %)  -JE        Cardio  Normal S1/S2  -JE        Pulmonary  Normal effort  -JE        Capillary Refill  Brisk  -JE        Peripheral Pulses  Radial  -JE        Peripheral Pulse  +3  -JE        Skin  Warm  -JE        Urine output assessed  Adequate  -JE           *OR*   Intensive Monitoring- Must Document One of the Following Four *:    Vital Signs Reviewed          * Central Venous Pressure (CVP or RAP)          * Central Venous Oxygen (SVO2, ScvO2 or Oxygen saturation via central catheter)          * Bedside Cardiovascular US in IVC diameter and % collapse          * Passive Leg Raise OR Crystalloid Challenge            User Key  (r) = Recorded By, (t) = Taken By, (c) = Cosigned By    Initials Name Provider Type    [address] [place] [address] Physician

## 2019-03-06 NOTE — UTILIZATION REVIEW
Initial Clinical Review    Admission: Date/Time/Statement   03/06/19 0155    Orders Placed This Encounter   Procedures    Place in Observation     Standing Status:   Standing     Number of Occurrences:   1     Order Specific Question:   Admitting Physician     Answer:   Joaquin Jonas [[id number]]     Order Specific Question:   Level of Care     Answer:   Med Surg [16]     ED: Date/Time/Mode of Arrival:   ED Arrival Information     Expected Arrival [address] of Arrival Escorted By Service Admission Type    3/5/2019 23:30 3/5/2019 23:13 Urgent Ambulance [facility] EMS Hospitalist Urgent    Arrival Complaint    Abdominal Pain        Chief Complaint:   Chief Complaint   Patient presents with    Abdominal Pain     patient reports 7 out of 10 abdominal and back pain starting around 1900  Patient denies N/V/D     Assessment/Plan:     62year old male presents to ed for evaluation and treatment of lower abdominal pain with dysuria  CT shows mild bladder wall thickening which may represent cystitis  Start iv ceftriaxone 3-17  Continue iv fluids for elevated lactic acid  Trend lactic acid  Follow urine culture  And monitor intake and output  ED Triage Vitals [03/05/19 2320]   98 3 °F (36 8 °C) (!) 117 18 139/79 98 %      Pain Score       7       03/06/19 120 kg (264 lb 8 8 oz)     Vital Signs (abnormal):    03/06/19 0045  111Abnormal    03/06/19 0015  109Abnormal    03/05/19 2330  117Abnormal            Pertinent Labs/Diagnostic Test Results:    CT CHEST, ABDOMEN AND PELVIS WITH IV CONTRAST   INDICATION:   generalized abdominal pain and chest pain    Mild urinary bladder wall thickening which may represent cystitis     ECG rate:  108    ECG rate assessment: tachycardic    Rhythm:     Rhythm: sinus rhythm    Ectopy:     Ectopy: none    QRS:     QRS axis:  Normal    QRS intervals:  Normal  Conduction:     Conduction: normal    ST segments:     ST segments:  Normal  T waves:     T waves: normal      Urine culture in process  ____  Lactic acid  2 5 H  Blood cultures x 2 in process___  UA wbc  4-10         ED Treatment:   Medication Administration from 03/05/2019 2313 to 03/06/2019 0253       Date/Time Order Dose Route     03/06/2019 0011 sodium chloride 0 9 % bolus 1,000 mL 1,000 mL Intravenous     03/06/2019 0011 ondansetron (ZOFRAN) injection 4 mg 4 mg Intravenous     03/06/2019 0107 sodium chloride 0 9 % bolus 1,000 mL 1,000 mL Intravenous     03/06/2019 0156 ceftriaxone (ROCEPHIN) 1 g/50 mL in dextrose IVPB 1,000 mg Intravenous         Past Medical History:    Diabetes mellitus ([facility] )    Homelessness     Admitting Diagnosis:     Lactic acidosis [E87 2]  AAA (abdominal aortic aneurysm) (HCC) [I71 4]  Chest pain [R07 9]  Abdominal pain [R10 9]  Tachycardia [R00 0]  Cystitis [N30 90]  Hyperglycemia [R73 9]  Sepsis ([facility] [address] ) [A41 9]      Age/Sex: 62 y o  male       Admission Orders:    I/O       aluminum-magnesium hydroxide-simethicone 15 mL Oral Q6H PRN   [START ON 3/7/2019] cefTRIAXone 1,000 mg Intravenous Q24H   divalproex sodium 250 mg Oral HS   docusate sodium 100 mg Oral BID PRN   enoxaparin 40 mg Subcutaneous Daily   furosemide 20 mg Oral BID   lisinopril 2 5 mg Oral Daily   methocarbamol 500 mg Oral TID   nicotine 1 patch Transdermal Daily   ondansetron 4 mg Intravenous Q6H PRN   potassium chloride 20 mEq Oral Daily   QUEtiapine 100 mg Oral BID   sodium chloride 125 mL/hr Intravenous Continuous

## 2019-03-06 NOTE — ASSESSMENT & PLAN NOTE
Patient claims to have dyslexia  She he also has depression and some psychiatric concerns and was in [facility] quite recently for 3 weeks  Will continue Seroquel

## 2019-03-07 VITALS
OXYGEN SATURATION: 98 % | HEART RATE: 91 BPM | DIASTOLIC BLOOD PRESSURE: 74 MMHG | SYSTOLIC BLOOD PRESSURE: 137 MMHG | TEMPERATURE: 97.2 F | RESPIRATION RATE: 17 BRPM

## 2019-03-07 LAB
ATRIAL RATE: 106 BPM
P AXIS: 43 DEGREES
PR INTERVAL: 142 MS
QRS AXIS: 55 DEGREES
QRSD INTERVAL: 92 MS
QT INTERVAL: 346 MS
QTC INTERVAL: 459 MS
T WAVE AXIS: 41 DEGREES
VENTRICULAR RATE: 106 BPM

## 2019-03-07 PROCEDURE — 93010 ELECTROCARDIOGRAM REPORT: CPT | Performed by: INTERNAL MEDICINE

## 2019-03-07 NOTE — ED NOTES
ORTHOSTATIC VITAL SIGNS BLOOD PRESSURE HEART RATE           LYING 138/83 93           SITTING 141/79 96           STANDING 127/72 95        [facility]  03/06/19 7205

## 2019-03-07 NOTE — ED NOTES
Patient informed he can stay until the morning,has no where to go and it is extremely cold outside  Patient given warm blankets and lights turned off,call in reach       Jeffry Sims RN  03/06/19 9904

## 2019-03-07 NOTE — ED NOTES
Patient asleep in bed, no distress noted, will continue to monitor       Flor Burch RN  03/07/19 0525

## 2019-03-07 NOTE — ED NOTES
Patient asleep in bed, no distress noted, will continue to monitor       Yessica Mendoza RN  03/07/19 5153

## 2019-03-07 NOTE — ED NOTES
Patient noted to be asleep in room at this time  No signs of distress noted  Side rails are up  Call bell is in reach       Domenic Neal RN  03/07/19 9339

## 2019-03-07 NOTE — ED PROVIDER NOTES
History  Chief Complaint   Patient presents with    Dizziness     states discharged from [facility] today for a belly ache  they told me i was faking something  was walking to [place] to go bowling and became dizzy so I came here   [facility]     states if discharged will just go back into the [facility]     22-year-old white male well known to the ED in discharge from [place] 2 days ago and [place] yesterday presents today with complaint of feeling dizzy  States he thinks because he was wearing a nicotine patch and smoked a cigarette  Patient without any symptoms at this time  Vital signs are normal   No chest pain, no shortness of breath, no headache, no change in vision, no nausea, no palpitations, no wheezing  No abdominal pain, no fever, no urinary symptoms, no back or flank pain  History provided by:  Patient  Dizziness   Quality:  Lightheadedness  Severity:  Mild  Onset quality:  Unable to specify  Duration:  1 hour  Timing:  Constant  Progression:  Resolved  Chronicity:  New  Context: physical activity    Relieved by:  None tried  Worsened by:  Nothing  Ineffective treatments:  None tried  Associated symptoms: no chest pain, no diarrhea, no headaches, no hearing loss, no nausea, no palpitations, no shortness of breath, no syncope, no tinnitus, no vision changes and no vomiting    Risk factors: no heart disease, no hx of vertigo and no Meniere's disease        Prior to Admission Medications   Prescriptions Last Dose Informant Patient Reported? Taking?    QUEtiapine (SEROquel) 100 mg tablet   Yes No   Sig: Take 100 mg by mouth 2 (two) times a day   divalproex sodium (DEPAKOTE) 500 mg EC tablet   Yes No   Sig: Take 250 mg by mouth daily at bedtime   furosemide (LASIX) 20 mg tablet   Yes No   Sig: Take 20 mg by mouth 2 (two) times a day   lisinopril (ZESTRIL) 2 5 mg tablet   Yes No   Sig: Take 2 5 mg by mouth daily   metFORMIN (GLUCOPHAGE) 500 mg tablet   Yes No   Sig: Take 500 mg by mouth 2 (two) times a day with meals   methocarbamol (ROBAXIN) 500 mg tablet   Yes No   Sig: Take 500 mg by mouth 3 (three) times a day   nicotine (NICODERM CQ) 14 mg/24hr TD 24 hr patch   Yes No   Sig: Place 1 patch on the skin every 24 hours   potassium chloride (KLOR-CON) 20 mEq packet   Yes No   Sig: Take 20 mEq by mouth 2 (two) times a day      Facility-Administered Medications: None       Past Medical History:   Diagnosis Date    [facility]     Homelessness     Hypertension        Past Surgical History:   Procedure Laterality Date    CHOLECYSTECTOMY         History reviewed  No pertinent family history  I have reviewed and agree with the history as documented  Social History     Tobacco Use    Smoking status: Current Some Day Smoker     Packs/day: 0 50     Types: Cigarettes    Smokeless tobacco: Never Used   Substance Use Topics    Alcohol use: Not Currently    Drug use: Not Currently        Review of Systems   Constitutional: Negative  HENT: Negative  Negative for hearing loss and tinnitus  Respiratory: Negative  Negative for shortness of breath  Cardiovascular: Positive for leg swelling  Negative for chest pain, palpitations and syncope  Gastrointestinal: Negative  Negative for diarrhea, nausea and vomiting  Genitourinary: Negative  Musculoskeletal: Negative  Skin: Negative  Neurological: Positive for dizziness and light-headedness  Negative for speech difficulty and headaches  Hematological: Negative  Psychiatric/Behavioral: Negative  All other systems reviewed and are negative  Physical Exam  Physical Exam   Constitutional: He is oriented to person, place, and time  He appears well-developed and well-nourished  HENT:   Head: Normocephalic and atraumatic     Right Ear: External ear normal    Left Ear: External ear normal    Nose: Nose normal    Mouth/Throat: Oropharynx is clear and moist    Eyes: Conjunctivae and EOM are normal    Neck: Normal range of motion  Neck supple  Cardiovascular: Normal rate, regular rhythm, normal heart sounds and intact distal pulses  Pulmonary/Chest: Effort normal and breath sounds normal    Abdominal: Soft  Bowel sounds are normal    Musculoskeletal: Normal range of motion  Neurological: He is alert and oriented to person, place, and time  Skin: Skin is warm and dry  Capillary refill takes less than 2 seconds  Psychiatric: He has a normal mood and affect  His behavior is normal  Judgment and thought content normal    Nursing note and vitals reviewed        Vital Signs  ED Triage Vitals [03/06/19 2059]   Temperature Pulse Respirations Blood Pressure SpO2   98 °F (36 7 °C) (!) 114 (!) 24 156/91 99 %      Temp Source Heart Rate Source Patient Position - Orthostatic VS BP Location FiO2 (%)   Tympanic Monitor Sitting Right arm --      Pain Score       No Pain           Vitals:    03/06/19 2230 03/06/19 2315 03/07/19 0030 03/07/19 0637   BP:  127/72  137/74   Pulse: 98 96 86 91   Patient Position - Orthostatic VS:    Lying       Visual Acuity      ED Medications  Medications - No data to display    Diagnostic Studies  Results Reviewed     Procedure Component Value Units Date/Time    Troponin I [[id number]]  (Normal) Collected:  03/06/19 2159    Lab Status:  Final result Specimen:  Blood from Arm, Left Updated:  03/06/19 2230     Troponin I <0 02 ng/mL     Comprehensive metabolic panel [[id number]]  (Abnormal) Collected:  03/06/19 2159    Lab Status:  Final result Specimen:  Blood from Arm, Left Updated:  03/06/19 2226     Sodium 138 mmol/L      Potassium 3 9 mmol/L      Chloride 103 mmol/L      CO2 27 mmol/L      ANION GAP 8 mmol/L      BUN 14 mg/dL      Creatinine 0 79 mg/dL      Glucose 142 mg/dL      Calcium 9 1 mg/dL      AST 12 U/L      ALT 21 U/L      Alkaline Phosphatase 79 U/L      Total Protein 6 9 g/dL      Albumin 3 6 g/dL      Total Bilirubin 0 30 mg/dL      eGFR 100 ml/min/1 73sq m     Narrative:       National Kidney Disease Education Program recommendations are as follows:  GFR calculation is accurate only with a steady state creatinine  Chronic Kidney disease less than 60 ml/min/1 73 sq  meters  Kidney failure less than 15 ml/min/1 73 sq  meters  CBC and differential [[id number]] Collected:  03/06/19 2159    Lab Status:  Final result Specimen:  Blood from Arm, Left Updated:  03/06/19 2208     WBC 6 74 Thousand/uL      RBC 4 59 Million/uL      Hemoglobin 12 6 g/dL      Hematocrit 39 7 %      MCV 87 fL      MCH 27 5 pg      MCHC 31 7 g/dL      RDW 14 0 %      MPV 12 1 fL      Platelets 304 Thousands/uL      nRBC 0 /100 WBCs      Neutrophils Relative 53 %      Immat GRANS % 0 %      Lymphocytes Relative 32 %      Monocytes Relative 12 %      Eosinophils Relative 2 %      Basophils Relative 1 %      Neutrophils Absolute 3 64 Thousands/µL      Immature Grans Absolute 0 01 Thousand/uL      Lymphocytes Absolute 2 12 Thousands/µL      Monocytes Absolute 0 79 Thousand/µL      Eosinophils Absolute 0 14 Thousand/µL      Basophils Absolute 0 04 Thousands/µL     Fingerstick Glucose (POCT) [[id number]]  (Normal) Collected:  03/06/19 2152    Lab Status:  Final result Updated:  03/06/19 2153     POC Glucose 131 mg/dl                  XR chest portable    (Results Pending)              Procedures  Procedures       Phone Contacts  ED Phone Contact    ED Course                   Initial Sepsis Screening     [address] Name 03/06/19 0100                Is the patient's history suggestive of a new or worsening infection? Yes (Proceed)  (Abnormal)   -JE        Suspected source of infection  urinary tract infection  -JE        Are two or more of the following signs & symptoms of infection both present and new to the patient?   Yes (Proceed)  (Abnormal)   -JE        Indicate SIRS criteria  Tachycardia > 90 bpm;Tachypnea > 20 resp per min  -Maria Ines Lara        If the answer is yes to both questions, suspicion of sepsis is present  --        If severe sepsis is present AND tissue hypoperfusion perists in the hour after fluid resuscitation or lactate > 4, the patient meets criteria for SEPTIC SHOCK  --        Are any of the following organ dysfunction criteria present within 6 hours of suspected infection and SIRS criteria that are NOT considered to be chronic conditions?   --        Organ dysfunction  --        Date of presentation of severe sepsis  --        Time of presentation of severe sepsis  --        Tissue hypoperfusion persists in the hour after crystalloid fluid administration, evidenced, by either:  --        Was hypotension present within one hour of the conclusion of crystalloid fluid administration?  --        Date of presentation of septic shock  --        Time of presentation of septic shock  --          User Key  (r) = Recorded By, (t) = Taken By, (c) = Cosigned By    Initials Name Provider Type    [address] [place] [address],  Physician           Default Flowsheet Data (last 720 hours)      Sepsis Reassess     Row Name 03/06/19 0157                   Repeat Volume Status and Tissue Perfusion Assessment Performed    Repeat Volume Status and Tissue Perfusion Assessment Performed  Yes  -JE           Volume Status and Tissue Perfusion Post Fluid Resuscitation * Must Document All *    Vital Signs Reviewed (HR, RR, BP, T)  Yes  -JE        Shock Index Reviewed  Yes  -JE        Arterial Oxygen Saturation Reviewed (POx, SaO2 or SpO2)  Yes (comment %)  -JE        Cardio  Normal S1/S2  -JE        Pulmonary  Normal effort  -JE        Capillary Refill  Brisk  -JE        Peripheral Pulses  Radial  -JE        Peripheral Pulse  +3  -JE        Skin  Warm  -JE        Urine output assessed  Adequate  -JE           *OR*   Intensive Monitoring- Must Document One of the Following Four *:    Vital Signs Reviewed  --        * Central Venous Pressure (CVP or RAP)  --        * Central Venous Oxygen (SVO2, ScvO2 or Oxygen saturation via central catheter)  --        * Bedside Cardiovascular US in IVC diameter and % collapse  --        * Passive Leg Raise OR Crystalloid Challenge  --          User Key  (r) = Recorded By, (t) = Taken By, (c) = Cosigned By    Initials Name Provider Type    [address] [place] [address] Physician                MDM    Disposition  Final diagnoses:   Dizziness   Homelessness     Time reflects when diagnosis was documented in both MDM as applicable and the Disposition within this note     Time User Action Codes Description Comment    3/6/2019 11:40 PM Verlee Furrow Add [R42] Dizziness     3/6/2019 11:41 PM Verlee Furrow Add [Z59 0] Homelessness       ED Disposition     ED Disposition Condition Date/Time Comment    Discharge Stable Wed Mar 6, 2019 11:40 PM Brandi Drum discharge to home/self care  Follow-up Information     Follow up With Specialties Details Why Contact Info    Eren Jones MD  In 3 days As needed [address]  [phone]            Discharge Medication List as of 3/6/2019 11:42 PM      CONTINUE these medications which have NOT CHANGED    Details   divalproex sodium (DEPAKOTE) 500 mg EC tablet Take 250 mg by mouth daily at bedtime, Historical Med      furosemide (LASIX) 20 mg tablet Take 20 mg by mouth 2 (two) times a day, Historical Med      lisinopril (ZESTRIL) 2 5 mg tablet Take 2 5 mg by mouth daily, Historical Med      metFORMIN (GLUCOPHAGE) 500 mg tablet Take 500 mg by mouth 2 (two) times a day with meals, Historical Med      methocarbamol (ROBAXIN) 500 mg tablet Take 500 mg by mouth 3 (three) times a day, Historical Med      nicotine (NICODERM CQ) 14 mg/24hr TD 24 hr patch Place 1 patch on the skin every 24 hours, Historical Med      potassium chloride (KLOR-CON) 20 mEq packet Take 20 mEq by mouth 2 (two) times a day, Historical Med      QUEtiapine (SEROquel) 100 mg tablet Take 100 mg by mouth 2 (two) times a day, Historical Med           No discharge procedures on file      ED Provider  Electronically Signed by           Sada Mejía MD  03/07/19 1484

## 2019-03-08 ENCOUNTER — HOSPITAL ENCOUNTER (EMERGENCY)
Facility: HOSPITAL | Age: 54
Discharge: HOME | End: 2019-03-08
Attending: EMERGENCY MEDICINE | Admitting: EMERGENCY MEDICINE
Payer: COMMERCIAL

## 2019-03-08 VITALS
RESPIRATION RATE: 18 BRPM | OXYGEN SATURATION: 98 % | HEART RATE: 100 BPM | DIASTOLIC BLOOD PRESSURE: 83 MMHG | TEMPERATURE: 96.5 F | SYSTOLIC BLOOD PRESSURE: 142 MMHG

## 2019-03-08 DIAGNOSIS — R09.81 NASAL CONGESTION: Primary | ICD-10-CM

## 2019-03-08 LAB — BACTERIA UR CULT: ABNORMAL

## 2019-03-08 PROCEDURE — 99282 EMERGENCY DEPT VISIT SF MDM: CPT

## 2019-03-11 LAB
BACTERIA BLD CULT: NORMAL
BACTERIA BLD CULT: NORMAL

## 2019-03-13 NOTE — ED PROVIDER NOTES
History  Chief Complaint   Patient presents with    Medication Refill     patient states he ran out of his blood pressure and diabetes medications  HPI  This is a 62 year male that presents with medication refill and concern for dry nose  Patient states he just want to get evaluated  States he ran out of his blood pressure medication a diabetes medication  He recently got it filled but his PCP but states he did not have time to get them filled  I asked the patient does he need medication but he refused states he will go the pharmacy to get a get them filled  I asked the patient why he is here in the hospital he states he wants to get his nose checked out since his nose is very dry  Denies any bleeding from the nose  States 1 of his friends told him to go to the ED to get his nose checked  Patient does not take any blood thinners  No headaches  No fevers or chills  Denies any cough or congestion  No other complaints  62 year male that presents today with nose check  No bleeding  No deviated septum  Prior to Admission Medications   Prescriptions Last Dose Informant Patient Reported? Taking?    QUEtiapine (SEROquel) 100 mg tablet   Yes Yes   Sig: Take 100 mg by mouth 2 (two) times a day   divalproex sodium (DEPAKOTE) 500 mg EC tablet   Yes Yes   Sig: Take 250 mg by mouth daily at bedtime   furosemide (LASIX) 20 mg tablet   Yes Yes   Sig: Take 20 mg by mouth 2 (two) times a day   lisinopril (ZESTRIL) 2 5 mg tablet   Yes Yes   Sig: Take 2 5 mg by mouth daily   metFORMIN (GLUCOPHAGE) 500 mg tablet   Yes Yes   Sig: Take 500 mg by mouth 2 (two) times a day with meals   methocarbamol (ROBAXIN) 500 mg tablet   Yes Yes   Sig: Take 500 mg by mouth 3 (three) times a day   nicotine (NICODERM CQ) 14 mg/24hr TD 24 hr patch   Yes Yes   Sig: Place 1 patch on the skin every 24 hours   potassium chloride (KLOR-CON) 20 mEq packet   Yes Yes   Sig: Take 20 mEq by mouth 2 (two) times a day      Facility-Administered Medications: None       Past Medical History:   Diagnosis Date    Diabetes mellitus ([facility] [address] )     Homelessness     Hypertension        Past Surgical History:   Procedure Laterality Date    CHOLECYSTECTOMY         History reviewed  No pertinent family history  I have reviewed and agree with the history as documented  Social History     Tobacco Use    Smoking status: Current Some Day Smoker     Packs/day: 0 50     Types: Cigarettes    Smokeless tobacco: Never Used   Substance Use Topics    Alcohol use: Not Currently    Drug use: Not Currently        Review of Systems   Constitutional: Negative  Negative for diaphoresis and fever  HENT: Negative  Respiratory: Negative  Negative for cough, shortness of breath and wheezing  Cardiovascular: Negative  Negative for chest pain, palpitations and leg swelling  Gastrointestinal: Negative for abdominal distention, abdominal pain, nausea and vomiting  Genitourinary: Negative  Musculoskeletal: Negative  Skin: Negative  Neurological: Negative  Psychiatric/Behavioral: Negative  All other systems reviewed and are negative  Physical Exam  Physical Exam   Constitutional: He is oriented to person, place, and time  He appears well-developed and well-nourished  No distress  HENT:   Head: Normocephalic and atraumatic  Nose: Nose normal    Mouth/Throat: Oropharynx is clear and moist    Eyes: Pupils are equal, round, and reactive to light  Conjunctivae and EOM are normal    Neck: Normal range of motion  Neck supple  Cardiovascular: Normal rate, regular rhythm and normal heart sounds  No murmur heard  Pulmonary/Chest: Effort normal and breath sounds normal  No respiratory distress  He has no wheezes  He has no rales  Abdominal: Soft  Bowel sounds are normal  He exhibits no distension  There is no tenderness  There is no rebound and no guarding  Musculoskeletal: Normal range of motion  He exhibits no edema, tenderness or deformity  Neurological: He is alert and oriented to person, place, and time  No cranial nerve deficit  Skin: Skin is warm and dry  No rash noted  He is not diaphoretic  No pallor  Psychiatric: He has a normal mood and affect  Vitals reviewed  Vital Signs  ED Triage Vitals [03/08/19 1331]   Temperature Pulse Respirations Blood Pressure SpO2   (!) 96 5 °F (35 8 °C) 100 18 142/83 98 %      Temp Source Heart Rate Source Patient Position - Orthostatic VS BP Location FiO2 (%)   Tympanic Monitor Lying Left arm --      Pain Score       --           Vitals:    03/08/19 1331   BP: 142/83   Pulse: 100   Patient Position - Orthostatic VS: Lying           Visual Acuity      ED Medications  Medications - No data to display    Diagnostic Studies  Results Reviewed     None                 No orders to display              Procedures  Procedures       Phone Contacts  ED Phone Contact    ED Course                               MDM    Disposition  Final diagnoses:   Nasal congestion     Time reflects when diagnosis was documented in both MDM as applicable and the Disposition within this note     Time User Action Codes Description Comment    3/8/2019  2:21 PM Diego Andre U  8  [R09 81] Nasal congestion       ED Disposition     ED Disposition Condition Date/Time Comment    Discharge Stable Fri Mar 8, 2019  2:21 PM Yeni Parry discharge to home/self care              Follow-up Information     Follow up With Specialties Details Why Contact Info    Rodney Kent MD  Schedule an appointment as soon as possible for a visit   [address]            Discharge Medication List as of 3/8/2019  2:22 PM      START taking these medications    Details   sodium chloride (OCEAN) 0 65 % nasal spray 1 spray into each nostril as needed for congestion, Starting Fri 3/8/2019, Print         CONTINUE these medications which have NOT CHANGED    Details   divalproex sodium (DEPAKOTE) 500 mg EC tablet Take 250 mg by mouth daily at bedtime, Historical Med      furosemide (LASIX) 20 mg tablet Take 20 mg by mouth 2 (two) times a day, Historical Med      lisinopril (ZESTRIL) 2 5 mg tablet Take 2 5 mg by mouth daily, Historical Med      metFORMIN (GLUCOPHAGE) 500 mg tablet Take 500 mg by mouth 2 (two) times a day with meals, Historical Med      methocarbamol (ROBAXIN) 500 mg tablet Take 500 mg by mouth 3 (three) times a day, Historical Med      nicotine (NICODERM CQ) 14 mg/24hr TD 24 hr patch Place 1 patch on the skin every 24 hours, Historical Med      potassium chloride (KLOR-CON) 20 mEq packet Take 20 mEq by mouth 2 (two) times a day, Historical Med      QUEtiapine (SEROquel) 100 mg tablet Take 100 mg by mouth 2 (two) times a day, Historical Med           No discharge procedures on file      ED Provider  Electronically Signed by           Lelo Stoll MD  03/13/19 8874

## 2023-11-24 NOTE — PLAN OF CARE
Problem: DISCHARGE PLANNING - CARE MANAGEMENT  Goal: Discharge to post-acute care or home with appropriate resources  Description  INTERVENTIONS:  - Conduct assessment to determine patient/family and health care team treatment goals, and need for post-acute services based on payer coverage, community resources, and patient preferences, and barriers to discharge  - Address psychosocial, clinical, and financial barriers to discharge as identified in assessment in conjunction with the patient/family and health care team  - Arrange appropriate level of post-acute services according to patient's   needs and preference and payer coverage in collaboration with the physician and health care team  - Communicate with and update the patient/family, physician, and health care team regarding progress on the discharge plan  - Arrange appropriate transportation to post-acute venues  - Follow medical plan of care    Outcome: Progressing Reason for Call:  Form, our goal is to have forms completed with 72 hours, however, some forms may require a visit or additional information.    Type of letter, form or note:  Home Health Certification    Who is the form from?: Home care, [facility]    Where did the form come from: form was faxed in    What clinic location was the form placed at?: [facility]     Where the form was placed: Given to MA/Minesh STRICKLAND's RN folder    What number is listed as a contact on the form?:   P # [phone]  F # [phone]       Additional comments:     Call taken on 11/24/2023 at 8:14 AM by Arnol Schwab
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
